# Patient Record
Sex: FEMALE | Race: WHITE | HISPANIC OR LATINO | Employment: UNEMPLOYED | URBAN - METROPOLITAN AREA
[De-identification: names, ages, dates, MRNs, and addresses within clinical notes are randomized per-mention and may not be internally consistent; named-entity substitution may affect disease eponyms.]

---

## 2018-02-07 ENCOUNTER — NON-PROVIDER VISIT (OUTPATIENT)
Dept: OBGYN | Facility: CLINIC | Age: 38
End: 2018-02-07

## 2018-02-07 DIAGNOSIS — Z32.01 PREGNANCY EXAMINATION OR TEST, POSITIVE RESULT: ICD-10-CM

## 2018-02-07 LAB
INT CON NEG: NEGATIVE
INT CON POS: POSITIVE
POC URINE PREGNANCY TEST: POSITIVE

## 2018-02-07 PROCEDURE — 81025 URINE PREGNANCY TEST: CPT | Performed by: OBSTETRICS & GYNECOLOGY

## 2018-02-15 ENCOUNTER — APPOINTMENT (OUTPATIENT)
Dept: OBGYN | Facility: CLINIC | Age: 38
End: 2018-02-15

## 2018-03-20 ENCOUNTER — INITIAL PRENATAL (OUTPATIENT)
Dept: OBGYN | Facility: CLINIC | Age: 38
End: 2018-03-20

## 2018-03-20 ENCOUNTER — HOSPITAL ENCOUNTER (OUTPATIENT)
Facility: MEDICAL CENTER | Age: 38
End: 2018-03-20
Attending: NURSE PRACTITIONER
Payer: COMMERCIAL

## 2018-03-20 VITALS
HEIGHT: 61 IN | WEIGHT: 136 LBS | DIASTOLIC BLOOD PRESSURE: 62 MMHG | BODY MASS INDEX: 25.68 KG/M2 | SYSTOLIC BLOOD PRESSURE: 114 MMHG

## 2018-03-20 DIAGNOSIS — O09.522 ELDERLY MULTIGRAVIDA IN SECOND TRIMESTER: ICD-10-CM

## 2018-03-20 DIAGNOSIS — Z34.82 ENCOUNTER FOR SUPERVISION OF OTHER NORMAL PREGNANCY IN SECOND TRIMESTER: ICD-10-CM

## 2018-03-20 DIAGNOSIS — Z34.82 ENCOUNTER FOR SUPERVISION OF OTHER NORMAL PREGNANCY IN SECOND TRIMESTER: Primary | ICD-10-CM

## 2018-03-20 LAB
APPEARANCE UR: NORMAL
BILIRUB UR STRIP-MCNC: NORMAL MG/DL
COLOR UR AUTO: NORMAL
GLUCOSE UR STRIP.AUTO-MCNC: NEGATIVE MG/DL
KETONES UR STRIP.AUTO-MCNC: NEGATIVE MG/DL
LEUKOCYTE ESTERASE UR QL STRIP.AUTO: NORMAL
NITRITE UR QL STRIP.AUTO: NEGATIVE
PH UR STRIP.AUTO: 6.5 [PH] (ref 5–8)
PROT UR QL STRIP: NEGATIVE MG/DL
RBC UR QL AUTO: NORMAL
SP GR UR STRIP.AUTO: 1.01
UROBILINOGEN UR STRIP-MCNC: NORMAL MG/DL

## 2018-03-20 PROCEDURE — 90471 IMMUNIZATION ADMIN: CPT | Performed by: NURSE PRACTITIONER

## 2018-03-20 PROCEDURE — 81002 URINALYSIS NONAUTO W/O SCOPE: CPT | Performed by: NURSE PRACTITIONER

## 2018-03-20 PROCEDURE — 90686 IIV4 VACC NO PRSV 0.5 ML IM: CPT | Performed by: NURSE PRACTITIONER

## 2018-03-20 PROCEDURE — 59401 PR NEW OB VISIT: CPT | Performed by: NURSE PRACTITIONER

## 2018-03-20 ASSESSMENT — ENCOUNTER SYMPTOMS
RESPIRATORY NEGATIVE: 1
PSYCHIATRIC NEGATIVE: 1
EYES NEGATIVE: 1
MUSCULOSKELETAL NEGATIVE: 1
NEUROLOGICAL NEGATIVE: 1
GASTROINTESTINAL NEGATIVE: 1
CONSTITUTIONAL NEGATIVE: 1
CARDIOVASCULAR NEGATIVE: 1

## 2018-03-20 NOTE — LETTER
Estudios Para Detectar los Portadores de la Fibrosis Quística   (La Enfermedad Fibroquística del Páncreas)    Gloria Guillermo Murali    Esta información esta relacionada con un examen de vivien que puede determinar si usted o hoffman yelitza es portador del gen que causa la enfermedad hereditaria que se llama la fibrosis quística.     ¿QUE ES LA ENFERMEDAD FIBROSIS QUÍSTICA?  · La  fibrosis quística es ellen enfermedad hereditaria que afecta a más de 25,000 niños y jóvenes adultos americanos.  · Los síntomas de la fibrosis quística varían de ellen persona a otra.  Los síntomas más comunes son congestión pulmonar, diarrea y retraso en el crecimiento del sarah beth.  La mayoría de las personas con la fibrosis quística padecen de problemas médicos muy severos, y algunas mueren jóvenes.  Otras tienen tan pocos síntomas que no se percatan de que tienen fibrosis quística.  · La fibrosis quística no afecta en absoluto la inteligencia de la persona.  · Aunque actualmente no hay ellen hallie para la fibrosis quística, los científicos están avanzando con respecto a nuevos tratamientos y en la búsqueda de la hallie.  Anteriormente la mayoría de las personas que padecían de fibrosis quística morían muy jóvenes.  Hoy en día, muchas personas que padecen de la fibrosis quística sobreviven hasta los 20 o 30 anos de edad.    ¿EXISTE LA POSIBILIDAD DE QUE MI MALLIKA PUEDA TENER FIBROSIS QUÍSTICA?  · Usted puede tener un hijo con la fibrosis quística aun cuando no hay nadie en hoffman moi que la padezca.  (Wally la grafica que sigue.)  · Existe ellen prueba que puede ayudarle a determinar si jovon un gen para la fibrosis quística y si por eso corre un riesgo de tener un hijo con la enfermedad.  · Si ambos padres son portadores del gen para la fibrosis quística, hay ellen (1) probabilidad entre 4 (25%) en cada embarazo, de que puedan tener un hijo afectada con fibrosis quística.  · Los portadores del gen para la fibrosis quística tienen ellen copia del gen  normal y el otro gen alterado.  · Las personas con fibrosis quística tienen dos genes alterados para la fibrosis quística,  · Normalmente la mayoría de individuos tienen dos copias normales del gen para fibrosis quística.    Riesgo aproximado de que ellen yelitza sin familiares con fibrosis quística pueda tener un hijo con fibrosis quística:  Origen / Riesgo  Ellen yelitza Caucásica (de laurie maría):  1 en 2,500  Ellen yelitza :  1 en 8,000  Ellen yelitza Afroamericana (de laurie neetu):  1 en 15,000  Ellen yelitza Asiática:  1 en 32,000    ¿EXISTEN PRUEBAS PARA DETECTAR LOS PORTADORES DE LA FIBROSIS QUÍSTICA?  · Hay ellen prueba de vivien para determinar si usted o hoffman yelitza portan el gen para la fibrosis quística.  · Es importante entender que la prueba no detecta todos los portadores del gen para la fibrosis quística.  · Si la prueba determina que ambos padres con portadores, se puede realizar otras pruebas para determinar si hoffman futuro christopher esta afectado.    ¿CUANTO YIAR LA PRUEBA PARA DETERMINAR SI SOY PORTADOR(A) DEL GEN PARA LA FIBROSIS QUÍSTICA?  · El costo de la prueba varia según hoffman póliza de seguro medico y el laboratorio donde se efectúa el análisis.  · El costo promedio de la prueba es de $300.  · Hoffman consejera genética puede darle mas información acera de esta prueba para determinar si usted es portador de la fibrosis quística.    _____  Si, yo estoy interesada y me gustaria obtener mas información al respecto.  _____  No tengo interés ni en hacerme la prueba para determinar si soy portador(a) de gen para la fibrosis quística ni en recibir mas información al respecto.    Firma del paciente: _____________________________   3/20/2018

## 2018-03-20 NOTE — PROGRESS NOTES
"Subjective:      S:  Gloria Carrion is a 37 y.o.  female  @ EGA: 17w2d JASMIN: Estimated Date of Delivery: 18  per LMP who presents for her new OB exam.  She has no complaints.  Desires AFP.  Declines CF.  Reports positive FM, no VB, LOF, or cramping.  Denies dysuria, vaginal DC.  Pt is  and lives with FOB. Works as homemaker.  Pregnancy is unplanned but desired.  Declines Centering Pregnancy.    O:    Vitals:    18 1405   BP: 114/62   Weight: 61.7 kg (136 lb)   Height: 1.549 m (5' 1\")    See H&P Prenatal Physical.  Wet mount: not indicated        FHTs: 150        Fundal ht: 17cm     A:   1.  IUP @ 17w2d JASMIN: Estimated Date of Delivery: 18 per LMP         2.  S=D        3.    Patient Active Problem List    Diagnosis Date Noted   • Elderly multigravida in second trimester 2018         P:  1.  GC/CT done. Pap done.         2.  Prenatal labs ordered - lab slip given        3.  Discussed PNV, diet, and adequate water intake        4.  NOB packet given        5.  Return to office in 4 wks        6.  Referral to Dr. Salmon for genetic counseling        7.  Flu vaccine today          HPI    Review of Systems   Constitutional: Negative.    HENT: Negative.    Eyes: Negative.    Respiratory: Negative.    Cardiovascular: Negative.    Gastrointestinal: Negative.    Genitourinary: Negative.         Uterus enlarged   Musculoskeletal: Negative.    Skin: Negative.    Neurological: Negative.    Endo/Heme/Allergies: Negative.    Psychiatric/Behavioral: Negative.           Objective:     /62   Ht 1.549 m (5' 1\")   Wt 61.7 kg (136 lb)   LMP 2017 (LMP Unknown)   BMI 25.70 kg/m²      Physical Exam   Constitutional: She is oriented to person, place, and time. She appears well-developed and well-nourished.   HENT:   Head: Normocephalic and atraumatic.   Eyes: Pupils are equal, round, and reactive to light.   Neck: Normal range of motion. Neck supple.   Cardiovascular: Normal " rate, regular rhythm and normal heart sounds.    Pulmonary/Chest: Effort normal and breath sounds normal.   Abdominal: Soft.   Genitourinary: Vagina normal and uterus normal.   Musculoskeletal: Normal range of motion.   Neurological: She is alert and oriented to person, place, and time. She has normal reflexes.   Skin: Skin is warm and dry.   Psychiatric: She has a normal mood and affect. Her behavior is normal. Judgment and thought content normal.   Nursing note and vitals reviewed.              Assessment/Plan:     1. Encounter for supervision of other normal pregnancy in second trimester    - THINPREP PAP W/HPV AND CTNG; Future  - PREG CNTR PRENATAL PN; Future  - AFP TETRA; Future  - Flu Quad Inj >3 Year Pre-Filled (Preservative Free)  - REFERRAL TO PERINATOLOGY    2. Elderly multigravida in second trimester

## 2018-03-21 NOTE — PROGRESS NOTES
Referral faxed to Dr. Salmon on 3/21/18.  They will contact patient to schedule appt.  Please check with patient if appt was made/ document. Thank you

## 2018-03-22 LAB
C TRACH DNA GENITAL QL NAA+PROBE: NEGATIVE
CYTOLOGY REG CYTOL: NORMAL
HPV HR 12 DNA CVX QL NAA+PROBE: NEGATIVE
HPV16 DNA SPEC QL NAA+PROBE: NEGATIVE
HPV18 DNA SPEC QL NAA+PROBE: NEGATIVE
N GONORRHOEA DNA GENITAL QL NAA+PROBE: NEGATIVE
SPECIMEN SOURCE: NORMAL
SPECIMEN SOURCE: NORMAL

## 2018-04-17 ENCOUNTER — ROUTINE PRENATAL (OUTPATIENT)
Dept: OBGYN | Facility: CLINIC | Age: 38
End: 2018-04-17

## 2018-04-17 VITALS — SYSTOLIC BLOOD PRESSURE: 122 MMHG | DIASTOLIC BLOOD PRESSURE: 64 MMHG | BODY MASS INDEX: 25.7 KG/M2 | WEIGHT: 136 LBS

## 2018-04-17 DIAGNOSIS — O09.522 ELDERLY MULTIGRAVIDA IN SECOND TRIMESTER: ICD-10-CM

## 2018-04-17 PROCEDURE — 90040 PR PRENATAL FOLLOW UP: CPT | Performed by: NURSE PRACTITIONER

## 2018-04-17 NOTE — PROGRESS NOTES
S) Pt is a 37 y.o.   at 21w2d  gestation. Routine prenatal care today. No complaints today. Hasn't yet done labs. Has US and appt with Dr Salmon on 18.  labor precautions discussed, all questions answered.    Fetal movement Normal  Cramping no  VB no  LOF no   Denies dysuria. Generally feels well today. Good self-care activities identified. Denies headaches, swelling, visual changes, or epigastric pain .     O) Blood pressure 122/64, weight 61.7 kg (136 lb), last menstrual period 2017.        Labs:       PNL: Not done yet       GCT: too early       AFP: Not done yet. Seeing Luis Antonio for genetic screening       GBS: N/A       Pertinent ultrasound -        Scheduled with Dr Salmon 18    A) IUP at 21w2d       S=D         Patient Active Problem List    Diagnosis Date Noted   • Elderly multigravida in second trimester 2018          SVE: deferred         TDAP: no       FLU: yes        BTL: no       : n/a       C/S Consent: n/a       IOL or C/S scheduled: no       LAST PAP: 3/20/18- Negative         P) s/s ptl vs general discomforts. Fetal movements reviewed. General ed and anticipatory guidance. Nutrition/exercise/vitamin. Plans breast Plans pp contraception- unsure  Continue PNV.

## 2018-04-17 NOTE — PROGRESS NOTES
Pt here today for OB follow up  Pt states no complaints   Reports +FM   Good # [phone]  Pharmacy Confirmed.  Pt has appt with HOANG on 4/19/18.  Pt will get labs done ASAP.

## 2018-04-20 ENCOUNTER — HOSPITAL ENCOUNTER (OUTPATIENT)
Dept: LAB | Facility: MEDICAL CENTER | Age: 38
End: 2018-04-20
Attending: NURSE PRACTITIONER
Payer: COMMERCIAL

## 2018-04-20 DIAGNOSIS — Z34.82 ENCOUNTER FOR SUPERVISION OF OTHER NORMAL PREGNANCY IN SECOND TRIMESTER: ICD-10-CM

## 2018-04-20 LAB
ABO GROUP BLD: NORMAL
APPEARANCE UR: CLEAR
BACTERIA #/AREA URNS HPF: ABNORMAL /HPF
BASOPHILS # BLD AUTO: 0.5 % (ref 0–1.8)
BASOPHILS # BLD: 0.04 K/UL (ref 0–0.12)
BILIRUB UR QL STRIP.AUTO: NEGATIVE
BLD GP AB SCN SERPL QL: NORMAL
COLOR UR: YELLOW
EOSINOPHIL # BLD AUTO: 0.05 K/UL (ref 0–0.51)
EOSINOPHIL NFR BLD: 0.6 % (ref 0–6.9)
EPI CELLS #/AREA URNS HPF: ABNORMAL /HPF
ERYTHROCYTE [DISTWIDTH] IN BLOOD BY AUTOMATED COUNT: 43.5 FL (ref 35.9–50)
GLUCOSE UR STRIP.AUTO-MCNC: NEGATIVE MG/DL
HBV SURFACE AG SER QL: NEGATIVE
HCT VFR BLD AUTO: 33.5 % (ref 37–47)
HGB BLD-MCNC: 11.5 G/DL (ref 12–16)
HIV 1+2 AB+HIV1 P24 AG SERPL QL IA: NON REACTIVE
HYALINE CASTS #/AREA URNS LPF: ABNORMAL /LPF
IMM GRANULOCYTES # BLD AUTO: 0.08 K/UL (ref 0–0.11)
IMM GRANULOCYTES NFR BLD AUTO: 1 % (ref 0–0.9)
KETONES UR STRIP.AUTO-MCNC: NEGATIVE MG/DL
LEUKOCYTE ESTERASE UR QL STRIP.AUTO: NEGATIVE
LYMPHOCYTES # BLD AUTO: 1.78 K/UL (ref 1–4.8)
LYMPHOCYTES NFR BLD: 22.4 % (ref 22–41)
MCH RBC QN AUTO: 32.8 PG (ref 27–33)
MCHC RBC AUTO-ENTMCNC: 34.3 G/DL (ref 33.6–35)
MCV RBC AUTO: 95.4 FL (ref 81.4–97.8)
MICRO URNS: ABNORMAL
MONOCYTES # BLD AUTO: 0.58 K/UL (ref 0–0.85)
MONOCYTES NFR BLD AUTO: 7.3 % (ref 0–13.4)
NEUTROPHILS # BLD AUTO: 5.43 K/UL (ref 2–7.15)
NEUTROPHILS NFR BLD: 68.2 % (ref 44–72)
NITRITE UR QL STRIP.AUTO: NEGATIVE
NRBC # BLD AUTO: 0 K/UL
NRBC BLD-RTO: 0 /100 WBC
PH UR STRIP.AUTO: 6 [PH]
PLATELET # BLD AUTO: 238 K/UL (ref 164–446)
PMV BLD AUTO: 10.6 FL (ref 9–12.9)
PROT UR QL STRIP: NEGATIVE MG/DL
RBC # BLD AUTO: 3.51 M/UL (ref 4.2–5.4)
RBC # URNS HPF: ABNORMAL /HPF
RBC UR QL AUTO: ABNORMAL
RH BLD: NORMAL
RUBV AB SER QL: 223.6 IU/ML
SP GR UR STRIP.AUTO: 1.02
TREPONEMA PALLIDUM IGG+IGM AB [PRESENCE] IN SERUM OR PLASMA BY IMMUNOASSAY: NON REACTIVE
UROBILINOGEN UR STRIP.AUTO-MCNC: 0.2 MG/DL
WBC # BLD AUTO: 8 K/UL (ref 4.8–10.8)
WBC #/AREA URNS HPF: ABNORMAL /HPF

## 2018-04-23 ENCOUNTER — DATING (OUTPATIENT)
Dept: OBGYN | Facility: CLINIC | Age: 38
End: 2018-04-23

## 2018-04-23 DIAGNOSIS — O09.522 ELDERLY MULTIGRAVIDA IN SECOND TRIMESTER: ICD-10-CM

## 2018-04-24 LAB
# FETUSES US: NORMAL
AFP MOM SERPL: 0.76
AFP SERPL-MCNC: 60 NG/ML
AGE - REPORTED: 37.1 YR
CURRENT SMOKER: NO
FAMILY MEMBER DISEASES HX: NO
GA METHOD: NORMAL
GA: NORMAL WK
HCG MOM SERPL: 0.82
HCG SERPL-ACNC: NORMAL IU/L
HX OF HEREDITARY DISORDERS: NO
IDDM PATIENT QL: NO
INHIBIN A MOM SERPL: 0.78
INHIBIN A SERPL-MCNC: 173 PG/ML
INTEGRATED SCN PATIENT-IMP: NORMAL
PATHOLOGY STUDY: NORMAL
SPECIMEN DRAWN SERPL: NORMAL
U ESTRIOL MOM SERPL: 0.78
U ESTRIOL SERPL-MCNC: 2.22 NG/ML

## 2018-05-15 ENCOUNTER — ROUTINE PRENATAL (OUTPATIENT)
Dept: OBGYN | Facility: CLINIC | Age: 38
End: 2018-05-15

## 2018-05-15 VITALS — SYSTOLIC BLOOD PRESSURE: 130 MMHG | BODY MASS INDEX: 26.83 KG/M2 | DIASTOLIC BLOOD PRESSURE: 78 MMHG | WEIGHT: 142 LBS

## 2018-05-15 DIAGNOSIS — O09.522 ELDERLY MULTIGRAVIDA IN SECOND TRIMESTER: ICD-10-CM

## 2018-05-15 PROCEDURE — 90040 PR PRENATAL FOLLOW UP: CPT | Performed by: PHYSICIAN ASSISTANT

## 2018-05-15 NOTE — PROGRESS NOTES
Ob f/u. + fetal movement good  No VB, LOF or contractions   C/O pelvic pain   Phone number # [phone]  Pharmacy verified with patient  WT= 142 lbs             OL=162/78  3rd trimester lab orders pended and instructions given to patient

## 2018-05-15 NOTE — PROGRESS NOTES
Pt has no complaints with cramping, bleeding or pain. +FM. US results d/w pt - all wnl. 1hr GTT, H/H, RPR slip given today with instructions. RTC 4 wk or sooner prn.

## 2018-05-29 ENCOUNTER — HOSPITAL ENCOUNTER (OUTPATIENT)
Dept: LAB | Facility: MEDICAL CENTER | Age: 38
End: 2018-05-29
Attending: PHYSICIAN ASSISTANT
Payer: COMMERCIAL

## 2018-05-29 DIAGNOSIS — R73.09 ABNORMAL GTT (GLUCOSE TOLERANCE TEST): Primary | ICD-10-CM

## 2018-05-29 DIAGNOSIS — O09.522 ELDERLY MULTIGRAVIDA IN SECOND TRIMESTER: ICD-10-CM

## 2018-05-29 LAB
GLUCOSE 1H P 50 G GLC PO SERPL-MCNC: 162 MG/DL (ref 70–139)
HCT VFR BLD AUTO: 33.5 % (ref 37–47)
HGB BLD-MCNC: 11.1 G/DL (ref 12–16)
TREPONEMA PALLIDUM IGG+IGM AB [PRESENCE] IN SERUM OR PLASMA BY IMMUNOASSAY: NON REACTIVE

## 2018-05-30 ENCOUNTER — TELEPHONE (OUTPATIENT)
Dept: OBGYN | Facility: CLINIC | Age: 38
End: 2018-05-30

## 2018-05-31 ENCOUNTER — HOSPITAL ENCOUNTER (OUTPATIENT)
Dept: LAB | Facility: MEDICAL CENTER | Age: 38
End: 2018-05-31
Attending: NURSE PRACTITIONER
Payer: COMMERCIAL

## 2018-05-31 DIAGNOSIS — R73.09 ABNORMAL GTT (GLUCOSE TOLERANCE TEST): ICD-10-CM

## 2018-05-31 LAB
GLUCOSE 1H P CHAL SERPL-MCNC: 137 MG/DL (ref 65–180)
GLUCOSE 2H P CHAL SERPL-MCNC: 109 MG/DL (ref 65–155)
GLUCOSE 3H P CHAL SERPL-MCNC: 101 MG/DL (ref 65–140)
GLUCOSE BS SERPL-MCNC: 77 MG/DL (ref 65–95)

## 2018-06-12 ENCOUNTER — ROUTINE PRENATAL (OUTPATIENT)
Dept: OBGYN | Facility: CLINIC | Age: 38
End: 2018-06-12

## 2018-06-12 VITALS — SYSTOLIC BLOOD PRESSURE: 126 MMHG | DIASTOLIC BLOOD PRESSURE: 80 MMHG | WEIGHT: 145 LBS | BODY MASS INDEX: 27.4 KG/M2

## 2018-06-12 DIAGNOSIS — O09.893 SUPERVISION OF OTHER HIGH RISK PREGNANCIES, THIRD TRIMESTER: Primary | ICD-10-CM

## 2018-06-12 DIAGNOSIS — O09.522 ELDERLY MULTIGRAVIDA IN SECOND TRIMESTER: ICD-10-CM

## 2018-06-12 PROCEDURE — 90471 IMMUNIZATION ADMIN: CPT | Performed by: NURSE PRACTITIONER

## 2018-06-12 PROCEDURE — 90040 PR PRENATAL FOLLOW UP: CPT | Performed by: NURSE PRACTITIONER

## 2018-06-12 PROCEDURE — 90715 TDAP VACCINE 7 YRS/> IM: CPT | Performed by: NURSE PRACTITIONER

## 2018-06-12 NOTE — PROGRESS NOTES
S:  Pt is  at 29w2d for routine OB follow up.  Reports some pelvic pain.  Reports good FM.  Denies VB, LOF, RUCs or vaginal DC.    O:  Please see above vitals.        FHTs: 139        Fundal ht: 29 cm.    A:  IUP at 29w2d  Patient Active Problem List    Diagnosis Date Noted   • Supervision of other high risk pregnancies, third trimester 2018   • Elderly multigravida in second trimester 2018        P:  1.  PP contraception: plans condoms.          2.  Instructions given on FKCs.          3.  Questions answered.          4.  Encouraged pt to tour L&D.          5.  Encourage adequate water intake.        6.  3hr GTT wnl - reviewed w pt.        7.   labor precautions reviewed.         8.  F/u 2 wks.        9.  TDap given.    I have placed the below orders and discussed them with an approved delegating provider. The MA is performing the below orders under the direction of  Dr. Fontenot.

## 2018-06-12 NOTE — PROGRESS NOTES
Tdap vaccine given. 06/12/2018 Right  Deltoid. VIS given and screening check list reviewed with pt. Verified by. Disha Velasco. C.N.M

## 2018-06-12 NOTE — LETTER
"Count Your Baby's Movements  Another step to a healthy delivery    Murali Li            Dept: [phone]    How Many Weeks Pregnant? 29w2d    Date to Begin Countin2018              How to use this chart    One way for your physician to keep track of your baby's health is by knowing how often the baby moves (or \"kicks\") in your womb.  You can help your physician to do this by using this chart every day.    Every day, you should see how many hours it takes for your baby to move 10 times.  Start in the morning, as soon as you get up.    · First, write down the time your baby moves until you get to 10.  · Check off one box every time your baby moves until you get to 10.  · Write down the time you finished counting in the last column.  · Total how long it took to count up all 10 movements.  · Finally, fill in the box that shows how long this took.  After counting 10 movements, you no longer have to count any more that day.  The next morning, just start counting again as soon as you get up.    What should you call a \"movement\"?  It is hard to say, because it will feel different from one mother to another and from one pregnancy to the next.  The important thing is that you count the movements the same way throughout your pregnancy.  If you have more questions, you should ask your physician.    Count carefully every day!  SAMPLE:  Week 28    How many hours did it take to feel 10 movements?       Start  Time     1     2     3     4     5     6     7     8     9     10   Finish Time   Mon 8:20 ·  ·  ·  ·  ·  ·  ·  ·  ·  ·  11:40   Tue               Wed               Thu               Fri               Sat               Sun                 IMPORTANT: You should contact your physician if it takes more than two hours for you to feel 10 movements.  Each morning, write down the time and start to count the movements of your baby.  Keep track by checking off one box every time you feel one movement.  When " "you have felt 10 \"kicks\", write down the time you finished counting in the last column.  Then fill in the   box (over the check ermias) for the number of hours it took.  Be sure to read the complete instructions on the previous page.            "

## 2018-06-12 NOTE — PATIENT INSTRUCTIONS
P:  1.  PP contraception: plans condoms.          2.  Instructions given on FKCs.          3.  Questions answered.          4.  Encouraged pt to tour L&D.          5.  Encourage adequate water intake.        6.  3hr GTT wnl - reviewed w pt.        7.   labor precautions reviewed.         8.  F/u 2 wks.        9.  TDap given.

## 2018-06-12 NOTE — PROGRESS NOTES
Ob f/u. + fetal movement   No VB, LOF or contractions   C/O pelvic pain   Phone number # [phone]  Pharmacy verified with patient  [id number]            GG=[id number]/80  WILLIE given today with instructions   BTL offered today. Pt declines

## 2018-06-28 ENCOUNTER — ROUTINE PRENATAL (OUTPATIENT)
Dept: OBGYN | Facility: CLINIC | Age: 38
End: 2018-06-28

## 2018-06-28 VITALS — SYSTOLIC BLOOD PRESSURE: 120 MMHG | BODY MASS INDEX: 28.15 KG/M2 | WEIGHT: 149 LBS | DIASTOLIC BLOOD PRESSURE: 68 MMHG

## 2018-06-28 DIAGNOSIS — O09.523 ELDERLY MULTIGRAVIDA IN THIRD TRIMESTER: ICD-10-CM

## 2018-06-28 DIAGNOSIS — O09.893 SUPERVISION OF OTHER HIGH RISK PREGNANCIES, THIRD TRIMESTER: ICD-10-CM

## 2018-06-28 PROCEDURE — 90040 PR PRENATAL FOLLOW UP: CPT | Performed by: NURSE PRACTITIONER

## 2018-06-28 NOTE — PROGRESS NOTES
SUBJECTIVE:  Pt is a 37 y.o.   at 31w4d  gestation. Presents today for follow-up prenatal care. Reports no issues at this time.  Reports good fetal movement. Denies cramping/contractions, bleeding or leaking of fluid. Denies dysuria, headaches, N/V, or other issues at this time. Generally feels well today.     OBJECTIVE:  - See prenatal vitals flow  -   Vitals:    18 1629   BP: 120/68   Weight: 67.6 kg (149 lb)                 ASSESSMENT:   - IUP at 31w4d    - S=D   -   Patient Active Problem List    Diagnosis Date Noted   • Supervision of other high risk pregnancies, third trimester 2018   • Elderly multigravida in third trimester 2018         PLAN:  - S/sx pregnancy and labor warning signs vs general discomforts discussed  - Fetal movements and kick counts reviewed   - Adequate hydration reinforced  - Nutrition/exercise/vitamin education; continued PNV  - Encouraged tour of LnD/childbirth education classes: contact info provided   - S/p TDAP vacc  - Anticipatory guidance given  - RTC in 2 weeks for follow-up prenatal care

## 2018-06-28 NOTE — PROGRESS NOTES
Pt here today for OB follow up  Pt states having lower abdominal pain   Reports +  Good # [phone]  Pharmacy Confirmed.

## 2018-06-28 NOTE — LETTER
Cuente los Movimientos de hoffman Bebé  Otro paso importante para la chava de hoffman bebé    Gloria Carrion     [facility]            Dept: [phone]    ¿Cuántas semanas tiene de embarazo? 31w4d    Fecha cuando tiene que comenzar a contar el movimiento: Ahora                  Madai debe usar geoff diagrama    Ellen manera en que hoffman doctor puede controlar a chava de hoffman bebé es sabiendo cuantas veces se mueve hoffman bebé en el útero, o por medio de las “pataditas”.  Usted podrá ayudarle a hoffman médico al usar cada día el siguiente diagrama.    Cada día, usted debe prestar atención a cuantas horas le lleva a hoffman bebé moverse 10 veces.  Comience a contar en la mañana, lo antes posible después de haberse levantado.    · Primeramente, escriba la hora en que se mueve hoffman bebé, hasta llegar a 10 veces.  · Colóquele un check o palomita a cada cuadrito cada vez que hoffman bebé se mueva hasta que complete 10 veces.  · Escriba la hora cuando termine de contar 10 veces en la última columna.  · Sume el total del tiempo que le llevó contar los 10 movimientos.  · Finalmente, complete el cuadrito de cuantas horas le llevó hacerlo.    Después de maryse contado los 10 movimientos, ya no tendrá que contar los demás movimientos por el amanda del día.  A la mañana siguiente, comience a contar de nuevo cuantas veces se mueve el bebé desde el momento en que se levante.    ¿Qué tendría que considerarse un “movimiento”?  Es difícil de decirlo porque es distinto de ellen madre a otra, y de un embarazo a otro.  Lo importante es que cuente el movimiento de la misma manera dustin el transcurso de hoffman embarazo.  Si tiene preguntas adicionales, pregúntele a hoffman doctor.    ¡Cuente cuidadosamente cada día!     MUESTRA:  Semana 28    ¿Cuántas horas le ha llevado sentir 10 movimientos?        Hora de Inicio     1     2     3     4     5     6     7     8     9     10   Hora de Finlizar   Corrine. 8:20 ·  ·  ·  ·  ·  ·  ·  ·  ·  ·  11:40   Mar.               Mié.                Jue.               Vie.               Sáb.               Dom.                 IMPORTANTE:  Usted debe contactar a hoffman doctor si le lleva más de 2 horas sentir 10 movimientos de hoffman bebé.    Cada mañana, escriba la hora de inicio y comience a contar los movimientos de hoffman bebé.  Hágalo colocándole un check o palomita a cada cuadrito cada vez que sienta un movimiento de hoffman bebé.  Cuando haya sentido 10 “pataditas”, escriba la hora en que terminó de contar en la última columna.  Luego, complete en la cajita (arriba de la elliott de check o palomita) el número total de horas que le llevó hacerlo.  Asegúrese de leer completamente las instrucciones en la página anterior.

## 2018-07-12 ENCOUNTER — ROUTINE PRENATAL (OUTPATIENT)
Dept: OBGYN | Facility: CLINIC | Age: 38
End: 2018-07-12

## 2018-07-12 VITALS — SYSTOLIC BLOOD PRESSURE: 116 MMHG | BODY MASS INDEX: 27.78 KG/M2 | WEIGHT: 147 LBS | DIASTOLIC BLOOD PRESSURE: 60 MMHG

## 2018-07-12 DIAGNOSIS — O09.523 ELDERLY MULTIGRAVIDA IN THIRD TRIMESTER: ICD-10-CM

## 2018-07-12 PROCEDURE — 90040 PR PRENATAL FOLLOW UP: CPT | Performed by: NURSE PRACTITIONER

## 2018-07-12 NOTE — PROGRESS NOTES
SUBJECTIVE:  Pt is a 37 y.o.   at 33w4d  gestation. Presents today for follow-up prenatal care. Reports no issues at this time.  Reports good fetal movement. Denies cramping/contractions, bleeding or leaking of fluid. Denies dysuria, headaches, N/V, or other issues at this time. Generally feels well today. Report lower menstrual like cramping with movement. Not drinking much water.     OBJECTIVE:  - See prenatal vitals flow  -   Vitals:    18 1606   BP: 116/60   Weight: 66.7 kg (147 lb)                 ASSESSMENT:   - IUP at 33w4d    - S=D   -   Patient Active Problem List    Diagnosis Date Noted   • Elderly multigravida in third trimester 2018         PLAN:  - S/sx pregnancy and labor warning signs vs general discomforts discussed  - Fetal movements and kick counts reviewed   - Adequate hydration reinforced  - Nutrition/exercise/vitamin education; continued PNV   - S/p TDAP vacc  - Preg support belt, more water, tylenol, warm/cold packs   - Anticipatory guidance given  - RTC in 2 weeks for follow-up prenatal care

## 2018-07-12 NOTE — PROGRESS NOTES
Pt here today for OB follow up  Pt states some cramping   Reports +MICHELL Colon # [phone]  Pharmacy Confirmed.

## 2018-07-26 ENCOUNTER — ROUTINE PRENATAL (OUTPATIENT)
Dept: OBGYN | Facility: CLINIC | Age: 38
End: 2018-07-26

## 2018-07-26 ENCOUNTER — HOSPITAL ENCOUNTER (OUTPATIENT)
Facility: MEDICAL CENTER | Age: 38
End: 2018-07-26
Attending: NURSE PRACTITIONER
Payer: COMMERCIAL

## 2018-07-26 VITALS — SYSTOLIC BLOOD PRESSURE: 120 MMHG | DIASTOLIC BLOOD PRESSURE: 64 MMHG | BODY MASS INDEX: 28.15 KG/M2 | WEIGHT: 149 LBS

## 2018-07-26 DIAGNOSIS — O09.523 ELDERLY MULTIGRAVIDA IN THIRD TRIMESTER: ICD-10-CM

## 2018-07-26 PROCEDURE — 90040 PR PRENATAL FOLLOW UP: CPT | Performed by: NURSE PRACTITIONER

## 2018-07-26 NOTE — PROGRESS NOTES
SUBJECTIVE:  Pt is a 37 y.o.   at 35w4d  gestation. Presents today for follow-up prenatal care. Reports no issues at this time.  Reports good fetal movement. Denies cramping/contractions, bleeding or leaking of fluid. Denies dysuria, headaches, N/V, or other issues at this time. Generally feels well today.     OBJECTIVE:  - See prenatal vitals flow  -   Vitals:    18 1521   BP: 120/64   Weight: 67.6 kg (149 lb)                 ASSESSMENT:   - IUP at 35w4d    - S=D   -   Patient Active Problem List    Diagnosis Date Noted   • Elderly multigravida in third trimester 2018         PLAN:  - S/sx pregnancy and labor warning signs vs general discomforts discussed  - Fetal movements and kick counts reviewed   - Adequate hydration reinforced  - Nutrition/exercise/vitamin education; continued PNV  - GBS collected   - S/p TDAP vacc  - Anticipatory guidance given  - RTC in 1 weeks for follow-up prenatal care

## 2018-07-26 NOTE — PROGRESS NOTES
OB f/u. + fetal movement.  No VB, LOF or UC's.  Good phone # [phone]  GBS collected today  Pt c/o pelvic pressure and pain

## 2018-07-28 LAB — GP B STREP DNA SPEC QL NAA+PROBE: NEGATIVE

## 2018-08-02 ENCOUNTER — ROUTINE PRENATAL (OUTPATIENT)
Dept: OBGYN | Facility: CLINIC | Age: 38
End: 2018-08-02

## 2018-08-02 VITALS — SYSTOLIC BLOOD PRESSURE: 110 MMHG | DIASTOLIC BLOOD PRESSURE: 68 MMHG | WEIGHT: 149 LBS | BODY MASS INDEX: 28.15 KG/M2

## 2018-08-02 DIAGNOSIS — O09.523 ELDERLY MULTIGRAVIDA IN THIRD TRIMESTER: ICD-10-CM

## 2018-08-02 DIAGNOSIS — O09.893 SUPERVISION OF OTHER HIGH RISK PREGNANCIES, THIRD TRIMESTER: Primary | ICD-10-CM

## 2018-08-02 PROCEDURE — 90040 PR PRENATAL FOLLOW UP: CPT | Performed by: NURSE PRACTITIONER

## 2018-08-02 NOTE — PATIENT INSTRUCTIONS
P:  1.  Continue FKCs.         2.  Labor precautions given.  Instructions given on where to go.  Pt receptive to education.         3.  Reviewed GBS status w pt.       4.  Questions answered.         5.  Encouraged adequate water intake       6.  F/u 1wk       7.  PP contraception: condoms.

## 2018-08-02 NOTE — PROGRESS NOTES
OB f/u. + fetal movement.  No VB, LOF   Good phone # [phone]  GBS negative  Pt c/o irregular UC

## 2018-08-02 NOTE — PROGRESS NOTES
S:  Pt is  at 36w4d here for routine OB follow up.  Reports irr UC.  Reports good FM.  Denies VB, LOF, RUCs, or vaginal DC.     O:  Please see above vitals.        FHTs: 138        Fundal ht: 36        Fetal position: vertex        SVE: deferred        GBS neg on 18 -- reviewed w pt.      A:  IUP at 36w4d  Patient Active Problem List    Diagnosis Date Noted   • Elderly multigravida in third trimester 2018       P:  1.  Continue FKCs.         2.  Labor precautions given.  Instructions given on where to go.  Pt receptive to education.         3.  Reviewed GBS status w pt.       4.  Questions answered.         5.  Encouraged adequate water intake       6.  F/u 1wk       7.  PP contraception: condoms.

## 2018-08-09 ENCOUNTER — ROUTINE PRENATAL (OUTPATIENT)
Dept: OBGYN | Facility: CLINIC | Age: 38
End: 2018-08-09

## 2018-08-09 VITALS — BODY MASS INDEX: 28.53 KG/M2 | SYSTOLIC BLOOD PRESSURE: 120 MMHG | DIASTOLIC BLOOD PRESSURE: 66 MMHG | WEIGHT: 151 LBS

## 2018-08-09 DIAGNOSIS — O09.523 ELDERLY MULTIGRAVIDA IN THIRD TRIMESTER: Primary | ICD-10-CM

## 2018-08-09 PROCEDURE — 90040 PR PRENATAL FOLLOW UP: CPT | Performed by: NURSE PRACTITIONER

## 2018-08-09 NOTE — PROGRESS NOTES
S) Pt is a 38 y.o.   at 37w4d  gestation. Routine prenatal care today. Pt c/o some discomfort, especially with moving, to left round ligament area.  Discussed rest, floating in pool if necessary.    Fetal movement Normal  Cramping no  VB no  LOF no   Denies dysuria. Generally feels well today. Good self-care activities identified. Denies headaches, swelling, visual changes, or epigastric pain .     O) Blood pressure 120/66, weight 68.5 kg (151 lb), last menstrual period 2017.        Labs: WNL       PNL: WNL       GCT: elevated 1 hr, normal 3 hr       AFP: normal       GBS: negative       Pertinent ultrasound - 18 Dr Salmon- no further f/u necessary           A) IUP at 37w4d       S=D         Patient Active Problem List    Diagnosis Date Noted   • Elderly multigravida in third trimester 2018          SVE: deferred         TDAP: yes       FLU: yes        BTL: no       : no       C/S Consent: no       IOL or C/S scheduled: no       LAST PAP: 3/20/18 negative         P) Labour precautions discussed.  Discussed  general discomforts. Fetal movements reviewed. General ed and anticipatory guidance. Nutrition/exercise/vitamin. Plans breast Plans pp contraception- unsure  Continue PNV. Discussed GBS results.  RTC 1 week.  Will do SVE next week and plan for IOL at 41 weeks.

## 2018-08-09 NOTE — PROGRESS NOTES
Pt here today for OB follow up  Pt states little back pain and pain lowe left quad  Reports +FM  Good # [phone]  Pharmacy Confirmed.  Chaperone offered and None needed.

## 2018-08-20 ENCOUNTER — ROUTINE PRENATAL (OUTPATIENT)
Dept: OBGYN | Facility: CLINIC | Age: 38
End: 2018-08-20

## 2018-08-20 VITALS — BODY MASS INDEX: 28.91 KG/M2 | DIASTOLIC BLOOD PRESSURE: 72 MMHG | WEIGHT: 153 LBS | SYSTOLIC BLOOD PRESSURE: 118 MMHG

## 2018-08-20 DIAGNOSIS — O09.523 ELDERLY MULTIGRAVIDA IN THIRD TRIMESTER: ICD-10-CM

## 2018-08-20 PROCEDURE — 90040 PR PRENATAL FOLLOW UP: CPT | Performed by: OBSTETRICS & GYNECOLOGY

## 2018-08-20 NOTE — PROGRESS NOTES
DESTIN:  39w1d    Pt reports doing well.  Denies vaginal bleeding, LOF.  Reports +FM.  Some pressure and irregular ctx.    /72   Wt 69.4 kg (153 lb)   LMP 2017 (LMP Unknown)   BMI 28.91 kg/m²   gen: AAO, NAD  FHTs: 145  FH: 39    SVE: 3/70/2  Pelvic exam was chaperoned by MA (TK).      A/P: 38 y.o.  @ 39w1d by lmp c/w 21wk US  - PNL up to date, Rh+, s/p Tdap  - GBS neg    Reviewed labor precautions    RTC 1wks    Berta Burrows MD  [facility], Women's Health

## 2018-08-20 NOTE — PROGRESS NOTES
"Pt here today for OB follow up  Pt states having some pain, pressure and UC\"s  Reports +  Good # [phone]  Pharmacy Confirmed.  Chaperone offered and Present.     "

## 2018-08-21 ENCOUNTER — HOSPITAL ENCOUNTER (INPATIENT)
Facility: MEDICAL CENTER | Age: 38
LOS: 2 days | End: 2018-08-23
Attending: OBSTETRICS & GYNECOLOGY | Admitting: OBSTETRICS & GYNECOLOGY
Payer: MEDICAID

## 2018-08-21 LAB
BASOPHILS # BLD AUTO: 0.3 % (ref 0–1.8)
BASOPHILS # BLD: 0.04 K/UL (ref 0–0.12)
EOSINOPHIL # BLD AUTO: 0.18 K/UL (ref 0–0.51)
EOSINOPHIL NFR BLD: 1.1 % (ref 0–6.9)
ERYTHROCYTE [DISTWIDTH] IN BLOOD BY AUTOMATED COUNT: 45.1 FL (ref 35.9–50)
HCT VFR BLD AUTO: 38.2 % (ref 37–47)
HGB BLD-MCNC: 12.7 G/DL (ref 12–16)
HOLDING TUBE BB 8507: NORMAL
IMM GRANULOCYTES # BLD AUTO: 0.15 K/UL (ref 0–0.11)
IMM GRANULOCYTES NFR BLD AUTO: 1 % (ref 0–0.9)
LYMPHOCYTES # BLD AUTO: 2.52 K/UL (ref 1–4.8)
LYMPHOCYTES NFR BLD: 16.1 % (ref 22–41)
MCH RBC QN AUTO: 29.3 PG (ref 27–33)
MCHC RBC AUTO-ENTMCNC: 33.2 G/DL (ref 33.6–35)
MCV RBC AUTO: 88 FL (ref 81.4–97.8)
MONOCYTES # BLD AUTO: 1.27 K/UL (ref 0–0.85)
MONOCYTES NFR BLD AUTO: 8.1 % (ref 0–13.4)
NEUTROPHILS # BLD AUTO: 11.54 K/UL (ref 2–7.15)
NEUTROPHILS NFR BLD: 73.4 % (ref 44–72)
NRBC # BLD AUTO: 0 K/UL
NRBC BLD-RTO: 0 /100 WBC
PLATELET # BLD AUTO: 285 K/UL (ref 164–446)
PMV BLD AUTO: 10.7 FL (ref 9–12.9)
RBC # BLD AUTO: 4.34 M/UL (ref 4.2–5.4)
WBC # BLD AUTO: 15.7 K/UL (ref 4.8–10.8)

## 2018-08-21 PROCEDURE — 36415 COLL VENOUS BLD VENIPUNCTURE: CPT

## 2018-08-21 PROCEDURE — 85025 COMPLETE CBC W/AUTO DIFF WBC: CPT

## 2018-08-21 PROCEDURE — 304965 HCHG RECOVERY SERVICES

## 2018-08-21 PROCEDURE — 59409 OBSTETRICAL CARE: CPT

## 2018-08-21 PROCEDURE — 700111 HCHG RX REV CODE 636 W/ 250 OVERRIDE (IP)

## 2018-08-21 PROCEDURE — 770002 HCHG ROOM/CARE - OB PRIVATE (112)

## 2018-08-21 RX ORDER — OXYCODONE HYDROCHLORIDE AND ACETAMINOPHEN 5; 325 MG/1; MG/1
1 TABLET ORAL EVERY 4 HOURS PRN
Status: DISCONTINUED | OUTPATIENT
Start: 2018-08-21 | End: 2018-08-23 | Stop reason: HOSPADM

## 2018-08-21 RX ORDER — ACETAMINOPHEN 325 MG/1
325 TABLET ORAL EVERY 4 HOURS PRN
Status: DISCONTINUED | OUTPATIENT
Start: 2018-08-21 | End: 2018-08-23 | Stop reason: HOSPADM

## 2018-08-21 RX ORDER — MISOPROSTOL 200 UG/1
800 TABLET ORAL
Status: DISCONTINUED | OUTPATIENT
Start: 2018-08-21 | End: 2018-08-21 | Stop reason: HOSPADM

## 2018-08-21 RX ORDER — IBUPROFEN 600 MG/1
600 TABLET ORAL EVERY 6 HOURS PRN
Status: DISCONTINUED | OUTPATIENT
Start: 2018-08-21 | End: 2018-08-23 | Stop reason: HOSPADM

## 2018-08-21 RX ORDER — SODIUM CHLORIDE, SODIUM LACTATE, POTASSIUM CHLORIDE, CALCIUM CHLORIDE 600; 310; 30; 20 MG/100ML; MG/100ML; MG/100ML; MG/100ML
INJECTION, SOLUTION INTRAVENOUS
Status: ACTIVE
Start: 2018-08-21 | End: 2018-08-22

## 2018-08-21 RX ORDER — METHYLERGONOVINE MALEATE 0.2 MG/ML
0.2 INJECTION INTRAVENOUS
Status: DISCONTINUED | OUTPATIENT
Start: 2018-08-21 | End: 2018-08-21 | Stop reason: HOSPADM

## 2018-08-21 RX ORDER — OXYTOCIN 10 [USP'U]/ML
INJECTION, SOLUTION INTRAMUSCULAR; INTRAVENOUS
Status: ACTIVE
Start: 2018-08-21 | End: 2018-08-22

## 2018-08-21 RX ORDER — OXYCODONE AND ACETAMINOPHEN 10; 325 MG/1; MG/1
1 TABLET ORAL EVERY 4 HOURS PRN
Status: DISCONTINUED | OUTPATIENT
Start: 2018-08-21 | End: 2018-08-23 | Stop reason: HOSPADM

## 2018-08-21 RX ADMIN — Medication 125 ML/HR: at 21:00

## 2018-08-21 ASSESSMENT — PATIENT HEALTH QUESTIONNAIRE - PHQ9
2. FEELING DOWN, DEPRESSED, IRRITABLE, OR HOPELESS: NOT AT ALL
1. LITTLE INTEREST OR PLEASURE IN DOING THINGS: NOT AT ALL
SUM OF ALL RESPONSES TO PHQ9 QUESTIONS 1 AND 2: 0

## 2018-08-21 ASSESSMENT — LIFESTYLE VARIABLES
ALCOHOL_USE: NO
EVER_SMOKED: NEVER

## 2018-08-21 ASSESSMENT — COPD QUESTIONNAIRES
COPD SCREENING SCORE: 0
DURING THE PAST 4 WEEKS HOW MUCH DID YOU FEEL SHORT OF BREATH: NONE/LITTLE OF THE TIME
IN THE PAST 12 MONTHS DO YOU DO LESS THAN YOU USED TO BECAUSE OF YOUR BREATHING PROBLEMS: DISAGREE/UNSURE
HAVE YOU SMOKED AT LEAST 100 CIGARETTES IN YOUR ENTIRE LIFE: NO/DON'T KNOW
DO YOU EVER COUGH UP ANY MUCUS OR PHLEGM?: NO/ONLY WITH OCCASIONAL COLDS OR INFECTIONS

## 2018-08-22 LAB
ERYTHROCYTE [DISTWIDTH] IN BLOOD BY AUTOMATED COUNT: 44.9 FL (ref 35.9–50)
HCT VFR BLD AUTO: 32.2 % (ref 37–47)
HGB BLD-MCNC: 10.7 G/DL (ref 12–16)
MCH RBC QN AUTO: 29.2 PG (ref 27–33)
MCHC RBC AUTO-ENTMCNC: 33.2 G/DL (ref 33.6–35)
MCV RBC AUTO: 87.7 FL (ref 81.4–97.8)
PLATELET # BLD AUTO: 243 K/UL (ref 164–446)
PMV BLD AUTO: 10.5 FL (ref 9–12.9)
RBC # BLD AUTO: 3.67 M/UL (ref 4.2–5.4)
WBC # BLD AUTO: 18 K/UL (ref 4.8–10.8)

## 2018-08-22 PROCEDURE — 36415 COLL VENOUS BLD VENIPUNCTURE: CPT

## 2018-08-22 PROCEDURE — 85027 COMPLETE CBC AUTOMATED: CPT

## 2018-08-22 PROCEDURE — 770002 HCHG ROOM/CARE - OB PRIVATE (112)

## 2018-08-22 RX ORDER — OXYCODONE HYDROCHLORIDE AND ACETAMINOPHEN 5; 325 MG/1; MG/1
2 TABLET ORAL EVERY 4 HOURS PRN
Status: DISCONTINUED | OUTPATIENT
Start: 2018-08-22 | End: 2018-08-23 | Stop reason: HOSPADM

## 2018-08-22 RX ORDER — IBUPROFEN 600 MG/1
600 TABLET ORAL EVERY 6 HOURS PRN
Status: DISCONTINUED | OUTPATIENT
Start: 2018-08-22 | End: 2018-08-23 | Stop reason: HOSPADM

## 2018-08-22 RX ORDER — ONDANSETRON 2 MG/ML
4 INJECTION INTRAMUSCULAR; INTRAVENOUS EVERY 6 HOURS PRN
Status: DISCONTINUED | OUTPATIENT
Start: 2018-08-22 | End: 2018-08-23 | Stop reason: HOSPADM

## 2018-08-22 RX ORDER — DOCUSATE SODIUM 100 MG/1
100 CAPSULE, LIQUID FILLED ORAL 2 TIMES DAILY
Qty: 60 CAP | Refills: 1 | Status: SHIPPED | OUTPATIENT
Start: 2018-08-22 | End: 2020-10-20

## 2018-08-22 RX ORDER — MISOPROSTOL 200 UG/1
800 TABLET ORAL PRN
Status: DISCONTINUED | OUTPATIENT
Start: 2018-08-22 | End: 2018-08-23 | Stop reason: HOSPADM

## 2018-08-22 RX ORDER — OXYCODONE HYDROCHLORIDE AND ACETAMINOPHEN 5; 325 MG/1; MG/1
1 TABLET ORAL EVERY 4 HOURS PRN
Status: DISCONTINUED | OUTPATIENT
Start: 2018-08-22 | End: 2018-08-23 | Stop reason: HOSPADM

## 2018-08-22 RX ORDER — METHYLERGONOVINE MALEATE 0.2 MG/ML
0.2 INJECTION INTRAVENOUS PRN
Status: DISCONTINUED | OUTPATIENT
Start: 2018-08-22 | End: 2018-08-23 | Stop reason: HOSPADM

## 2018-08-22 RX ORDER — DOCUSATE SODIUM 100 MG/1
100 CAPSULE, LIQUID FILLED ORAL 2 TIMES DAILY PRN
Status: DISCONTINUED | OUTPATIENT
Start: 2018-08-22 | End: 2018-08-23 | Stop reason: HOSPADM

## 2018-08-22 RX ORDER — VITAMIN A ACETATE, BETA CAROTENE, ASCORBIC ACID, CHOLECALCIFEROL, .ALPHA.-TOCOPHEROL ACETATE, DL-, THIAMINE MONONITRATE, RIBOFLAVIN, NIACINAMIDE, PYRIDOXINE HYDROCHLORIDE, FOLIC ACID, CYANOCOBALAMIN, CALCIUM CARBONATE, FERROUS FUMARATE, ZINC OXIDE, CUPRIC OXIDE 3080; 12; 120; 400; 1; 1.84; 3; 20; 22; 920; 25; 200; 27; 10; 2 [IU]/1; UG/1; MG/1; [IU]/1; MG/1; MG/1; MG/1; MG/1; MG/1; [IU]/1; MG/1; MG/1; MG/1; MG/1; MG/1
1 TABLET, FILM COATED ORAL EVERY MORNING
Status: DISCONTINUED | OUTPATIENT
Start: 2018-08-23 | End: 2018-08-23 | Stop reason: HOSPADM

## 2018-08-22 RX ORDER — IBUPROFEN 800 MG/1
800 TABLET ORAL EVERY 8 HOURS PRN
Qty: 30 TAB | Refills: 1 | Status: SHIPPED | OUTPATIENT
Start: 2018-08-22 | End: 2020-10-20

## 2018-08-22 ASSESSMENT — PAIN SCALES - GENERAL
PAINLEVEL_OUTOF10: 3
PAINLEVEL_OUTOF10: 0

## 2018-08-22 NOTE — CARE PLAN
Problem: Venous Thromboembolism (VTW)/Deep Vein Thrombosis (DVT) Prevention:  Goal: Patient will participate in Venous Thrombosis (VTE)/Deep Vein Thrombosis (DVT)Prevention Measures  Outcome: PROGRESSING AS EXPECTED  Pt ambulatory, no current s/s of DVT. Pt encouraged to call if she notices any changes from baseline.    Problem: Pain Management  Goal: Pain level will decrease to patient's comfort goal  Outcome: PROGRESSING AS EXPECTED  Pt states she will let staff know if she requires any additional pain interventions.

## 2018-08-22 NOTE — PROGRESS NOTES
37y/o  edc 2018, EGA 39 2/, Here to l&d room S219 with FOB. C/O rupture of membranes. EFM/TOCO applied, patients states positive fetal movement. Denies vaginal bleeding. SVE complete/+1, room prepped for delivery, DARRYL Franz updated    viable female, apgars 7/9   placenta S/I   report to KATI Cancino RN.

## 2018-08-22 NOTE — CARE PLAN
Problem: Altered physiologic condition related to immediate post-delivery state and potential for bleeding/hemorrhage  Goal: Patient physiologically stable as evidenced by normal lochia, palpable uterine involution and vital signs within normal limits  Outcome: PROGRESSING AS EXPECTED  VSS. Patient denies any dizziness. Fundus firm with light lochia.     Problem: Alteration in comfort related to episiotomy, vaginal repair and/or after birth pains  Goal: Patient is able to ambulate, care for self and infant  Outcome: PROGRESSING AS EXPECTED  Patient ambulating. Caring for self and infant. States she will call when needing pain medication.

## 2018-08-22 NOTE — H&P
"  History and Physical    Gloria Carrion is a 38 y.o. female  -Para:     Gestational Age:  39w3d  Admitted for:   Active Labor  Admitted to  [facility].  [facility]    HPI: Patient is admitted with the above mentioned Chief Complaint and States   Loss of fluid:   positive  Abdominal Pain:  negative  Uterine Contractions:  positive  Vaginal Bleeding:  negative  Fetal Movement:  normal  Patient denies fever, chills, nausea, vomiting , headache, visual disturbance, or dysuria  Patient's last menstrual period was 2017 (lmp unknown).  Estimated Date of Delivery: 18  Final JASMIN: 2018, by Last Menstrual Period    Patient Active Problem List    Diagnosis Date Noted   • Elderly multigravida in third trimester 2018       Admitting DX: Delivery  Labor and delivery, indication for care  Pregnancy Complications:  none  OB Risk Factors:   advanced maternal age  Labor State:    Active phase labor.    History:   has no past medical history on file.     has no past surgical history on file.    OB History    Para Term  AB Living   4 3 3     3   SAB TAB Ectopic Molar Multiple Live Births             3      # Outcome Date GA Lbr Bishnu/2nd Weight Sex Delivery Anes PTL Lv   4 Current            3 Term 10/07/06 40w0d  3.1 kg (6 lb 13.4 oz) F Vag-Spont   ROSALIA   2 Term / 40w0d  2.4 kg (5 lb 4.7 oz) M Vag-Spont   ROSALIA   1 Term 02 40w0d  2.8 kg (6 lb 2.8 oz) M Vag-Spont   ROSALIA          Medications:  No current facility-administered medications on file prior to encounter.      No current outpatient prescriptions on file prior to encounter.       Allergies:  Patient has no known allergies.    ROS:   Neuro: negative    Cardiovascular: negative  Gastro intestinal: negative  Genitourinary: negative            Physical Exam:  BP (!) 98/62   Pulse 69   Temp 36.8 °C (98.3 °F)   Resp 17   Ht 1.549 m (5' 1\")   Wt 69.4 kg (153 lb)  "  LMP 11/19/2017 (LMP Unknown)   SpO2 99%   Breastfeeding? Yes   BMI 28.91 kg/m²   Constitutional: healthy-appearing, Well-developed, well-nourished  No JVD: while supine  HEENT: PERRLA  Breast Exam: negative  Cardio: regular rate and rhythm  Lung: unlabored respirations, no intercostal retractions or accessory muscle use  Abdomen: abdomen is soft without significant tenderness, masses, organomegaly or guarding  Extremity: extremities, peripheral pulses and reflexes normal    Cervical Exam: 100%  Cervix Dilatation: 10  Station: negative 0  Pelvis: Normal  Fetal Assessment: Fetal heart variability: moderate  Fetal Heart Rate decelerations: none  Baseline FHR: 140 per minute  Estimated Fetal Weight: 3000 - 3500g      Labs:  Recent Labs      08/21/18 2000 08/22/18   0254   WBC  15.7*  18.0*   RBC  4.34  3.67*   HEMOGLOBIN  12.7  10.7*   HEMATOCRIT  38.2  32.2*   MCV  88.0  87.7   MCH  29.3  29.2   MCHC  33.2*  33.2*   RDW  45.1  44.9   PLATELETCT  285  243   MPV  10.7  10.5     Prenatal Results     1st Trimester     Test Value Date Time    ABO O  04/20/18 1130    RH POS  04/20/18 1130    Antibody NEG  04/20/18 1130    CBC/PLT/DIFF       HGB 10.7 g/dL (L) 08/22/18 0254    Platelets 243 K/uL 08/22/18 0254    HGB A1C        1 Hr  mg/dL (H) 05/29/18 0938    3 Hr  mg/dL 05/31/18 0810    Rubella 223.60 IU/mL 04/20/18 1118    RPR       Urine Culture       24 Urine Protein        24 Urine Creatinine        HBsAg Negative  04/20/18 1118    Hep CAB        HIV       Gonorrhea       Chlamydia       TSH        Free T4         TB       Pap       SYPHILUS TREP QUAL [id number] [[id number]][             2nd Trimester     Test Value Date Time    HCT 32.2 % (L) 08/22/18 0254    HGB 10.7 g/dL (L) 08/22/18 0254    1 Hr  mg/dL (H) 05/29/18 0938    3 Hr  mg/dL 05/31/18 0810          24-28 Weeks     Test Value Date Time    1 Hr GCT  162 mg/dL (H) 05/29/18 0938    TSH        Free T4        24 Urine Protein       24  Urine Creatinine       BUN       Creatinine       GFR       AST       ALT       Uric Acid       LDH             3rd Trimester     Test Value Date Time    HCT 32.2 % (L) 18 0254    HGB 10.7 g/dL (L) 18 0254    TSH       Free T4       24 Hr Urine Protein       24 Hr Urine Creatinine       SYPHILUS TREP QUAL Non Reactive  18 0938          35-37 Weeks     Test Value Date Time    GBS PCR LB Negative  18 1531    GBS PCR Negative  18 1531          Genetic Screening     Test Value Date Time    Cystic Fibrosis       AFP Quad Screen Neg  18 1118    Sickle Cell                     Assessment:  Gestational Age:  39w3d  Labor State:   Labor, Active  Risk Factors:   advanced maternal age  Pregnancy Complications: none    Patient Active Problem List    Diagnosis Date Noted   • Elderly multigravida in third trimester 2018       Plan:   Admitted for: Active Labor, GBS neg, anticipate .       AMERICO Hinds.

## 2018-08-22 NOTE — PROGRESS NOTES
2039: Report received from GRAHAM Wilson RN. POC discussed.    2300: Pt up to bathroom to void, tolerated well.

## 2018-08-22 NOTE — PROGRESS NOTES
Pt to room 320 via wheelchair infant in arms. Report received from KRISHNA Tamayo. Bands verified. Cuddles tag on and flashing. Patient declines pain at this time. States she will call when needing pain medication.  Assessment done, fundus firm with light lochia. Patient oriented to unit and room. Call light/emergency call light and pink badges discussed. Discussed POC. Whiteboards updated, POC discussed. Call light within reach. Patient encouraged to call with any needs and or concerns.

## 2018-08-22 NOTE — DISCHARGE SUMMARY
Discharge Summary:      Gloria Carrion      Admit Date:   2018  Discharge Date:  2018     Admitting diagnosis:  Delivery  Labor and delivery, indication for care  Discharge Diagnosis: Status post vaginal, spontaneous.  Pregnancy Complications: none  Tubal Ligation:  no        History:  No past medical history on file.  OB History    Para Term  AB Living   4 3 3     3   SAB TAB Ectopic Molar Multiple Live Births             3      # Outcome Date GA Lbr Bishnu/2nd Weight Sex Delivery Anes PTL Lv   4 Current            3 Term 10/07/06 40w0d  3.1 kg (6 lb 13.4 oz) F Vag-Spont   ROSALIA   2 Term 03 40w0d  2.4 kg (5 lb 4.7 oz) M Vag-Spont   ROSALIA   1 Term 02 40w0d  2.8 kg (6 lb 2.8 oz) M Vag-Spont   ROSALIA           Patient has no known allergies.  Patient Active Problem List    Diagnosis Date Noted   • Elderly multigravida in third trimester 2018        Hospital Course:   38 y.o. , now para 4, was admitted with the above mentioned diagnosis, underwent Active Labor, vaginal, spontaneous. Patient postpartum course was unremarkable, with progressive advancement in diet , ambulation and toleration of oral analgesia. Patient without complaints today and desires discharge.      Vitals:    18 0000 18 0400 18 0830 18 2000   BP: 108/61 (!) 98/62 113/61 113/70   Pulse: 69 69 81 82   Resp: 16 17 18 16   Temp: 37 °C (98.6 °F) 36.8 °C (98.3 °F) 36.9 °C (98.4 °F) 36.9 °C (98.4 °F)   SpO2: 96% 99% 94% 100%   Weight:       Height:           Current Facility-Administered Medications   Medication Dose   • oxytocin (PITOCIN) infusion (for postpartum)   mL/hr   • miSOPROStol (CYTOTEC) tablet 800 mcg  800 mcg   • methylergonovine (METHERGINE) injection 0.2 mg  0.2 mg   • ibuprofen (MOTRIN) tablet 600 mg  600 mg   • oxyCODONE-acetaminophen (PERCOCET) 5-325 MG per tablet 1 Tab  1 Tab   • oxyCODONE-acetaminophen (PERCOCET) 5-325 MG per tablet 2 Tab  2 Tab   • ondansetron  (ZOFRAN) syringe/vial injection 4 mg  4 mg   • docusate sodium (COLACE) capsule 100 mg  100 mg   • prenatal plus vitamin (STUARTNATAL 1+1) 27-1 MG tablet 1 Tab  1 Tab   • oxytocin (PITOCIN) infusion (for postpartum)   mL/hr   • ibuprofen (MOTRIN) tablet 600 mg  600 mg   • acetaminophen (TYLENOL) tablet 325 mg  325 mg   • oxyCODONE-acetaminophen (PERCOCET) 5-325 MG per tablet 1 Tab  1 Tab   • oxyCODONE-acetaminophen (PERCOCET-10)  MG per tablet 1 Tab  1 Tab       Exam:  Breast Exam: negative  Abdomen: Abdomen soft, non-tender. BS normal. No masses,  No organomegaly  Fundus Non Tender: no  Incision: none  Perineum: perineum intact  Extremity: extremities, peripheral pulses and reflexes normal     Labs:  Recent Labs      08/21/18 2000 08/22/18   0254   WBC  15.7*  18.0*   RBC  4.34  3.67*   HEMOGLOBIN  12.7  10.7*   HEMATOCRIT  38.2  32.2*   MCV  88.0  87.7   MCH  29.3  29.2   MCHC  33.2*  33.2*   RDW  45.1  44.9   PLATELETCT  285  243   MPV  10.7  10.5        Activity:   Discharge to home  Pelvic Rest x 6 weeks    Assessment:  #normal postpartum course  # hx of PP depression: SW visited and provided resources  Discharge Assessment: Voiding without difficulty, Taking adequate diet and fluids, No heavy bleeding or foul vaginal discharge      Follow up: [facility] or [facility] in 5 weeks for vaginal; To resume daily PNV and iron supplement if needed with hydration.   Patient to RT [facility] or ER if any of the following occur:  Fever over 100.5  Severe abdominal pain  Red streaks or painful masses in the breasts  Foul smelling discharge or lochia  Heavy vaginal bleeding saturating a pad per hour  S/s of PP depression     Discharge Meds:   Current Outpatient Prescriptions   Medication Sig Dispense Refill   • docusate sodium (COLACE) 100 MG Cap Take 1 Cap by mouth 2 times a day. 60 Cap 1   • ibuprofen (MOTRIN) 800 MG Tab Take 1 Tab by mouth every 8 hours as needed. 30 Tab 1       Patricia Rico,  M.D.

## 2018-08-22 NOTE — DISCHARGE PLANNING
:    Received order from RN regarding MOB who has a history of post partum depression.  Per RN, MOB is [place] speaking only.  RN (who is [place] speaking) stated she would provide MOB with the resources and go over information with her.  Provided RN the counseling resource specializing in post partum, pediatrician list, children and family resource list, diaper bank referral, and list of WIC locations.

## 2018-08-22 NOTE — PROGRESS NOTES
Mother Turkish speaking used ipad . Mother's nipple inverted at tip bilaterally, able to hand express colostrum easily from right breast. Mother reports breast fed previous babies, however this baby latching better than other babies. Mother has WIC and plans to follow-up with WIC once discharged. Demo on breast massage & hand express one. Assisted baby to left breast using cross cradle hold, skin to skin, observed deep latch, mother denies pain with latch.     Teaching on  Hunger cues, breastfeeding when baby shows cues or by 3 hours from last feed, importance of skin to skin, getting baby to open wide for deep latch, positioning baby at breast & cluster feeding.     Breastfeeding POC:  Breastfeed on demand or by 3 hours from last feed, lots of skin to skin. F/U with WIC once discharged.

## 2018-08-23 VITALS
TEMPERATURE: 98.4 F | RESPIRATION RATE: 18 BRPM | DIASTOLIC BLOOD PRESSURE: 64 MMHG | OXYGEN SATURATION: 99 % | HEART RATE: 72 BPM | WEIGHT: 153 LBS | SYSTOLIC BLOOD PRESSURE: 102 MMHG | HEIGHT: 61 IN | BODY MASS INDEX: 28.89 KG/M2

## 2018-08-23 PROCEDURE — A9270 NON-COVERED ITEM OR SERVICE: HCPCS | Performed by: PHYSICIAN ASSISTANT

## 2018-08-23 PROCEDURE — 700112 HCHG RX REV CODE 229: Performed by: PHYSICIAN ASSISTANT

## 2018-08-23 PROCEDURE — 700102 HCHG RX REV CODE 250 W/ 637 OVERRIDE(OP): Performed by: PHYSICIAN ASSISTANT

## 2018-08-23 RX ADMIN — IBUPROFEN 600 MG: 600 TABLET, FILM COATED ORAL at 07:57

## 2018-08-23 RX ADMIN — DOCUSATE SODIUM 100 MG: 100 CAPSULE, LIQUID FILLED ORAL at 07:57

## 2018-08-23 RX ADMIN — Medication 1 TABLET: at 07:57

## 2018-08-23 NOTE — DISCHARGE INSTRUCTIONS
POSTPARTUM DISCHARGE INSTRUCTIONS FOR MOM    YOB: 1980   Age: 38 y.o.               Admit Date: 2018     Discharge Date: 2018  Attending Doctor:  Veronika Moralez M.D.                  Allergies:  Patient has no known allergies.    Discharged to home by car. Discharged via wheelchair, hospital escort: Yes.  Special equipment needed: Not Applicable  Belongings with: Personal  Be sure to schedule a follow-up appointment with your primary care doctor or any specialists as instructed.     Discharge Plan:   Influenza Vaccine Indication: Not indicated: Previously immunized this influenza season and > 8 years of age    REASONS TO CALL YOUR OBSTETRICIAN:  1.   Persistent fever or shaking chills (Temperature higher than 100.4)  2.   Heavy bleeding (soaking more than 1 pad per hour); Passing clots  3.   Foul odor from vagina  4.   Mastitis (Breast infection; breast pain, chills, fever, redness)  5.   Urinary pain, burning or frequency  6.   Episiotomy infection  7.   Abdominal incision infection  8.   Severe depression longer than 24 hours    HAND WASHING  · Prior to handling the baby.  · Before breastfeeding or bottle feeding baby.  · After using the bathroom or changing the baby's diaper.    WOUND CARE  Ask your physician for additional care instructions.  In general:    ·  Incision:      · Keep clean and dry.    · Do NOT lift anything heavier than your baby for up to 6 weeks.    · There should not be any opening or pus.      VAGINAL CARE  · Nothing inside vagina for 6 weeks: no sexual intercourse, tampons or douching.  · Bleeding may continue for 2-4 weeks.  Amount may vary.    · Call your physician for heavy bleeding which means soaking more than 1 pad per hour    BIRTH CONTROL  · It is possible to become pregnant at any time after delivery and while breastfeeding.  · Plan to discuss a method of birth control with your physician at your follow up visit. visit.    DIET AND  "ELIMINATION  · Eating more fiber (bran cereal, fruits, and vegetables) and drinking plenty of fluids will help to avoid constipation.  · Urinary frequency after childbirth is normal.    POSTPARTUM BLUES  During the first few days after birth, you may experience a sense of the \"blues\" which may include impatience, irritability or even crying.  These feeling come and go quickly.  However, as many as 1 in 10 women experience emotional symptoms known as postpartum depression.    Postpartum depression:  May start as early as the second or third day after delivery or take several weeks or months to develop.  Symptoms of \"blues\" are present, but are more intense:  Crying spells; loss of appetite; feelings of hopelessness or loss of control; fear of touching the baby; over concern or no concern at all about the baby; little or no concern about your own appearance/caring for yourself; and/or inability to sleep or excessive sleeping.  Contact your physician if you are experiencing any of these symptoms.    Crisis Hotline:  · [place] Crisis Hotline:  6-252-YVQRUBA  Or 1-780.988.5608  · [place] Crisis Hotline:  1-351.368.6654  Or 813-478-1508    PREVENTING SHAKEN BABY:  If you are angry or stressed, PUT THE BABY IN THE CRIB, step away, take some deep breaths, and wait until you are calm to care for the baby.  DO NOT SHAKE THE BABY.  You are not alone, call a supporter for help.    · Crisis Call Center 24/7 crisis line 589-206-0078 or 1-466.865.8929  · You can also text them, text \"ANSWER\" to 842263    QUIT SMOKING/TOBACCO USE:  I understand the use of any tobacco products increases my chance of suffering from future heart disease and could cause other illnesses which may shorten my life. Quitting the use of tobacco products is the single most important thing I can do to improve my health. For further information on smoking / tobacco cessation call a Toll Free Quit Line at 1-231.621.9168 (*National Cancer [place]) or " 1-680.748.9573 (American Lung Association) or you can access the web based program at www.lungusa.org.    · [place] Tobacco Users Help Line:  (981) 122-2825       Toll Free: 1-148.574.1827  · Quit Tobacco Program [facility] (605)334-8880    DEPRESSION / SUICIDE RISK:  As you are discharged from this [facility], it is important to learn how to keep safe from harming yourself.    Recognize the warning signs:  · Abrupt changes in personality, positive or negative- including increase in energy   · Giving away possessions  · Change in eating patterns- significant weight changes-  positive or negative  · Change in sleeping patterns- unable to sleep or sleeping all the time   · Unwillingness or inability to communicate  · Depression  · Unusual sadness, discouragement and loneliness  · Talk of wanting to die  · Neglect of personal appearance   · Rebelliousness- reckless behavior  · Withdrawal from people/activities they love  · Confusion- inability to concentrate     If you or a loved one observes any of these behaviors or has concerns about self-harm, here's what you can do:  · Talk about it- your feelings and reasons for harming yourself  · Remove any means that you might use to hurt yourself (examples: pills, rope, extension cords, firearm)  · Get professional help from the community (Mental Health, Substance Abuse, psychological counseling)  · Do not be alone:Call your Safe Contact- someone whom you trust who will be there for you.  · Call your local CRISIS HOTLINE 065-9026 or 311-631-6476  · Call your local Children's Mobile Crisis Response Team [place] (803) 586-0646 or www.Hydrobee  · Call the toll free National Suicide Prevention Hotlines   · National Suicide Prevention Lifeline 460-199-YEKY (1648)  · National Hope Line Network 800-SUICIDE (461-0257)    DISCHARGE SURVEY:  Thank you for choosing [facility].  We hope we provided you with very good care.  You may be  receiving a survey in the mail.  Please fill it out.  Your opinion is valuable to us.    ADDITIONAL EDUCATIONAL MATERIALS GIVEN TO PATIENT:      DISCHARGE INSTRUCTIONS (Cameroonian) POSTPARTUM FOR MOM      DMITRI LAS JEIMY:  · Antes de tocar el bebé.  · Antes del amamantamiento o darle al bebé lactancia artificial.  · Después de usar el baño.    CUIDADO DE LAS HERIDAS:  · La Incisión de la Operación Cesárea:  Mantenga limpea y seca, NO levante nada que pesa más que hoffman bebé por 6 semenas.  No debe ninguna apertura ni pus.  · Episiotomía/Elias Vaginal:  Use un spray de Tucks o Dermoplast, tome un baño de asiento cuando necesite (6 pulgadas), siga usando la botella Lauren hasta que pare de sangrar.    CUIDADA DEL SENO:  · Lleve un sostén.  · Si esta` amamantando no use jabón en el seno ni en los pezones.  · Aplique lanolina si los pezones se ponen quebrazados y si le duelen.    CUIDADO DE LA VAGINA:  · [place] puede entrar la vagina por 6 semanas:  Actividad sexual, Ducha vaginal, Tampones.  · El sangramiento puede seguir por 2 a 4 semanas.  La cantidad es variable.  Llame a hoffman med`ico(a) obstetra si hay mucha vivien (si usa ma`s de ellen toalla femenina cada hora).    CONTRACEPCIÒN:  · Es posible embarazarse en cualquier tiempo despus del parto y mientras el amamantamiento.  · Debe planear de discutir los metodos de cantracepción con hoffman médico(a) cuando vuelva en 6 semanas para hoffman revisión médica.    DIETA Y DEFECACÒN:  · Para evitar la constipación coma mas fibra (cereal salvado, fruta, y verduras) Y tome muchos liquidos.   · Es común orinar frecuentemente después del parto.    POSTPARTO Y LA DEPRESÒN:  Sonam los primeros marino después del parto es común sentirse:  · Impaciente, irritable, o llorar  Estos sentimientos llegan y van rapidamente.  No obstante, veena ellen de cada jaimie mujeres tiene síntomas emocionales conocidos gonzalez depresión postparto.  · Despresión Postparto:  Puede empezar tan pronto gonzalez el massimo o tercer día  después del parto o puede durar algunas semanas o meses para desarrollar.  Síntomas de la depresión pueden presentarse, palmer son más intensos:  · Pérdida del hambre  · Frecuencia de llorar  · Sentirse desesperada o perder el control  · Demasiada o no suficiente preocupación con hoffman bebé  · Tener miedo de tocar el bebé  · No preocuparse con hoffman propia apariencia  · Incapacidad de dormir o dormir excesivamente    DEPRESIÒN / RIESGO AL SUICIDIO:    Cuando se le da de kristopher de alguna entidad de [facility] [employer], es importante aprender a mantener a dorothy de hacerse daño a sí mismo.    Reconocer los signos de advertencia:  · Cambios bruscos en la peronalidad, positiva o negativa, incluyendo aumento de la energia  · Regalar posesiones  · Cambios en patrones de comer - significativos cambios de peso - positivos o negativos  · Cambio de patrones para dormir - no poder dormir o dormir todo el tiempo  · Falta de voluntad o incapacidad de comunicarse  · Depresión  · Edda, desánimo y tristeza inusual  · Habla de querer morir  · Descuido del aspecto personal  · Rebeldía - comportamiento imprudente  · Retiro de personas y actividades que les gusta  · Confusión-incapacidad para concentrarse    Si usted o un ser querido observa cualquiera de estos comportamientos o tiene preocupaciones de hacerse daño a si mismo, aquí le damos lo que usted puede hacer:  · Hablar de ti - tus sentimientos y razones para hacerse paola a si mismo  · Retire cualquier medio que se podría utilizar para lastimarse (ejemplos: píldoras, cuerdas, cordones de extensión, arma de ryan)  · Obtenga ayuda profesional de la comunidad (chava mental, abuso de sustancias, orientación psicológica)  · No estar solo: llamar a un contacto seguro - ellen persona que confía en que estará allí por usted  · Llamada local L´INEA de CRISIS [phone] y [phone]  · Llamada local [employer] [facility] Crisis [facility] ([phone] or  www.Zipscene.com  · Llamada gratuita nacional, líneas de prevención del suicidio  · Preventión del Suicidio Nacional Lifeline 291-421-KDLL (4414)  · Línea Nacional de la [place] 800-SUICIDE (754-8830)       ([employer])            My signature on this form indicates that:  1.  I have reviewed and understand the above information  2.  My questions regarding this information have been answered to my satisfaction.  3.  I have formulated a plan with my discharge nurse to obtain my prescribed medication for home.

## 2018-08-23 NOTE — PROGRESS NOTES
1900 Received bedside report from KRISHNA Zayas. Discussed plan of care. Patient will call for pain medication as needed. All needs met at this time.

## 2018-08-23 NOTE — PROGRESS NOTES
[facility] POSTPARTUM PROGRESS NOTE    PATIENT ID:  NAME:  Gloria Carrion  MRN:               [id number]  YOB: 1980     38 y.o. female admitted  now  at 39w4d PPD#2 s/p     Subjective: Abdominal pain: no  Ambulating: yes  Tolerating liquids: yes  Tolerating regular diet: yes  Flatus: yes  BM: no  Bleeding: yes  Voiding: yes  Dizziness: no  Breast feeding: yes  Breast tenderness: no    Objective:    Vitals:    18 0000 18 0400 18 0830 18   BP: 108/61 (!) 98/62 113/61 113/70   Pulse: 69 69 81 82   Resp: 16    Temp: 37 °C (98.6 °F) 36.8 °C (98.3 °F) 36.9 °C (98.4 °F) 36.9 °C (98.4 °F)   SpO2: 96% 99% 94% 100%   Weight:       Height:         General: No acute distress, resting comfortably in bed.  HEENT: normocephalic, nontraumatic, PERRLA, EOMI  Cardiovascular: Heart RRR with no murmurs, rubs or gallops. Distal Pulses 2+  Respiratory: symmetric chest expansion, lungs CTA bilaterally with no wheezes rales or rhonci  Abdomen: soft, mildly tender, fundus firm, +BS  Genitourinary: lochia light, denies excessive vaginal bleeding  Musculoskeletal: strength 5/5 in four extremities  Neuro: non focal with no numbness, tingling or changes in sensation    Recent Labs      18   0254   WBC  15.7*  18.0*   RBC  4.34  3.67*   HEMOGLOBIN  12.7  10.7*   HEMATOCRIT  38.2  32.2*   MCV  88.0  87.7   MCH  29.3  29.2   RDW  45.1  44.9   PLATELETCT  285  243   MPV  10.7  10.5   NEUTSPOLYS  73.40*   --    LYMPHOCYTES  16.10*   --    MONOCYTES  8.10   --    EOSINOPHILS  1.10   --    BASOPHILS  0.30   --      No results for input(s): SODIUM, POTASSIUM, CHLORIDE, CO2, GLUCOSE, BUN, CPKTOTAL in the last 72 hours.    Current Meds:   Current Facility-Administered Medications   Medication Dose Frequency Provider Last Rate Last Dose   • oxytocin (PITOCIN) infusion (for postpartum)   mL/hr Continuous Choco Franz, P.A. 125 mL/hr at 18 0000 125  mL/hr at 18 0000   • miSOPROStol (CYTOTEC) tablet 800 mcg  800 mcg PRN Choco Franz, P.A.       • methylergonovine (METHERGINE) injection 0.2 mg  0.2 mg PRN Choco Franz, P.A.       • ibuprofen (MOTRIN) tablet 600 mg  600 mg Q6HRS PRN Choco Franz, P.A.       • oxyCODONE-acetaminophen (PERCOCET) 5-325 MG per tablet 1 Tab  1 Tab Q4HRS PRN Choco QUINTERO. Osei, P.A.       • oxyCODONE-acetaminophen (PERCOCET) 5-325 MG per tablet 2 Tab  2 Tab Q4HRS PRN Choco Franz, P.A.       • ondansetron (ZOFRAN) syringe/vial injection 4 mg  4 mg Q6HRS PRN Choco Franz, P.A.       • docusate sodium (COLACE) capsule 100 mg  100 mg BID PRN Choco Franz, P.A.       • prenatal plus vitamin (STUARTNATAL 1+1) 27-1 MG tablet 1 Tab  1 Tab QAM Choco Franz, P.A.       • oxytocin (PITOCIN) infusion (for postpartum)   mL/hr Continuous Choco Franz, P.A. 125 mL/hr at 18 2100 125 mL/hr at 18 2100   • ibuprofen (MOTRIN) tablet 600 mg  600 mg Q6HRS PRN Choco Franz, P.A.       • acetaminophen (TYLENOL) tablet 325 mg  325 mg Q4HRS PRN Choco Franz, P.A.       • oxyCODONE-acetaminophen (PERCOCET) 5-325 MG per tablet 1 Tab  1 Tab Q4HRS PRN Choco Franz, P.A.       • oxyCODONE-acetaminophen (PERCOCET-10)  MG per tablet 1 Tab  1 Tab Q4HRS PRN Choco QUINTERO. Osei, P.A.         Last reviewed on 2018  8:41 PM by Jair Wilson R.N.     Assessment:  38 y.o. female admitted  now  at 39w4d PPD#2 s/p     Plan:   1. Discharged canceled because baby was not discharged  2. Mom with Hx of PP depression: SW consulted and provided resources  3. Home today please d/c summary from     Patricia Rico M.D.

## 2018-08-23 NOTE — DISCHARGE PLANNING
:    Discharge Planning Assessment Post Partum    Reason for Referral: Questions about insurance and resources.  Address: [address] [place] [address] [place] [address]  Phone: [phone]  Type of Living Situation: living with FOB and children  Mom Diagnosis: Pregnancy  Baby Diagnosis: [place]  Primary Language: Kazakh only, the [employer]  was used    Father of the Baby: Joann [place] Mukherjee   Involved in baby’s care? Yes  Contact Information: [phone]    Prenatal Care: Yes  Mom's PCP: None  PCP for new baby:Pediatrician list provided    Support System: FOB  Coping/Bonding between mother & baby: Yes  Source of Feeding: Breast feeding  Supplies for Infant: Prepared    Mom's Insurance: Self-pay.  Spoke with Eliot who will contact family  Baby Covered on Insurance:N/A  Mother Employed/School: No  Other children in the home/names & ages: Parents have 3 other children    Financial Hardship/Income: No insurance, FOB is working at [employer]   Mom's Mental status: A&Ox4  Services used prior to admit: WIC    CPS History: No  Psychiatric History: No  Domestic Violence History: No  Drug/ETOH History: No    Resources Provided: Pediatrician list, children and family resource list  Referrals Made: diaper bank referral provided     Clearance for Discharge: Infant is cleared to discharge home with parents.

## 2018-10-01 NOTE — ADDENDUM NOTE
Encounter addended by: Stefani Villa R.N. on: 10/1/2018  9:17 AM<BR>    Actions taken: Flowsheet accepted

## 2019-04-03 NOTE — TELEPHONE ENCOUNTER
Notes recorded by Disha Velasco C.N.M. on 5/29/2018 at 1:46 PM PDT  Abnormal 1hr GTT - needs 3hr asap.    Pt notified of abnormal 1hr gtt and need to do 3hr gtt this time. Pt instructed to fast 10-12 hrs  pt only allow to drink plain water during fasting time. Pt will call lab to schedule an appt. Advised to bring a snack for after the test is done. Pt notified will be staying in the labs for the 3hr. Pt agreed to do it 3/31/18. Pt verbalized understanding.         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 Work up with Dr. Chet Malloy negative thus far.  She will keep follow up with heme-onc

## 2020-09-02 ENCOUNTER — GYNECOLOGY VISIT (OUTPATIENT)
Dept: OBGYN | Facility: CLINIC | Age: 40
End: 2020-09-02

## 2020-09-02 VITALS
HEIGHT: 60 IN | DIASTOLIC BLOOD PRESSURE: 60 MMHG | WEIGHT: 146.2 LBS | BODY MASS INDEX: 28.7 KG/M2 | SYSTOLIC BLOOD PRESSURE: 108 MMHG

## 2020-09-02 DIAGNOSIS — N93.8 DUB (DYSFUNCTIONAL UTERINE BLEEDING): ICD-10-CM

## 2020-09-02 DIAGNOSIS — O21.9 NAUSEA AND VOMITING IN PREGNANCY: ICD-10-CM

## 2020-09-02 DIAGNOSIS — Z32.01 POSITIVE PREGNANCY TEST: ICD-10-CM

## 2020-09-02 LAB
INT CON NEG: NEGATIVE
INT CON POS: POSITIVE
POC URINE PREGNANCY TEST: POSITIVE

## 2020-09-02 PROCEDURE — 76830 TRANSVAGINAL US NON-OB: CPT | Performed by: OBSTETRICS & GYNECOLOGY

## 2020-09-02 PROCEDURE — 81025 URINE PREGNANCY TEST: CPT | Performed by: OBSTETRICS & GYNECOLOGY

## 2020-09-02 PROCEDURE — 99203 OFFICE O/P NEW LOW 30 MIN: CPT | Mod: 25 | Performed by: OBSTETRICS & GYNECOLOGY

## 2020-09-02 RX ORDER — ONDANSETRON 4 MG/1
4 TABLET, FILM COATED ORAL EVERY 6 HOURS PRN
Qty: 20 EACH | Refills: 2 | Status: SHIPPED | OUTPATIENT
Start: 2020-09-02 | End: 2020-09-09

## 2020-09-02 NOTE — NON-PROVIDER
Patient here for GYN/DUB.  UPT= postive  LMP= 5/25/2020  JASMIN= 03/01/21  GA= 14w2d  Last pap 03/20/2018 WNL  Phone number: [phone]  Pharmacy verified  C/o pt states having some nausea and vomiting. Pt states that last week she did have a little bit of blood when she wiped, she states it only happened once.

## 2020-09-02 NOTE — PROGRESS NOTES
Subjective:      Gloria Carrion is a 40 y.o. female who presents with Gynecologic Exam (dub)            HPI patient is a 40-year-old  5 para 4 who presents today with amenorrhea and positive home pregnancy test.  Pregnancy was unplanned.  Patient was not using any birth control.  Her only symptoms are some intermittent nausea and vomiting but states for the past 2 days she has had persistent symptoms.  She denies any pain or bleeding.    ROS all organ systems are reviewed and were negative except for complaints in HPI       Objective:     /60   Ht 1.524 m (5')   Wt 66.3 kg (146 lb 3.2 oz)   BMI 28.55 kg/m²      Physical Exam  Vitals signs and nursing note reviewed. Exam conducted with a chaperone present.   Constitutional:       Appearance: Normal appearance. She is obese. She is not toxic-appearing.   HENT:      Head: Normocephalic and atraumatic.   Neck:      Musculoskeletal: Normal range of motion and neck supple. No neck rigidity.   Cardiovascular:      Rate and Rhythm: Normal rate and regular rhythm.      Pulses: Normal pulses.      Heart sounds: Normal heart sounds. No murmur.   Pulmonary:      Effort: Pulmonary effort is normal. No respiratory distress.      Breath sounds: Normal breath sounds. No wheezing.   Abdominal:      General: Abdomen is flat. Bowel sounds are normal. There is no distension.      Palpations: Abdomen is soft.      Tenderness: There is no abdominal tenderness.   Genitourinary:     General: Normal vulva.   Musculoskeletal: Normal range of motion.      Right lower leg: No edema.      Left lower leg: No edema.   Lymphadenopathy:      Cervical: No cervical adenopathy.   Skin:     General: Skin is warm and dry.   Neurological:      General: No focal deficit present.      Mental Status: She is alert and oriented to person, place, and time.   Psychiatric:         Mood and Affect: Mood normal.         Behavior: Behavior normal.         Thought Content: Thought content  normal.         Judgment: Judgment normal.            Ultrasound was performed and read by me:    Peterson intrauterine pregnancy in variable presentation noted  Fetal heart rate was 141 bpm  Fetal biometry measurements give a gestational age of 14 weeks and 2 days  EDC by fetal biometry measurement is 3/1/2021  EDC by LMP is 3/1/2021  No adnexal masses noted    Impression: Peterson intrauterine pregnancy at 14 weeks and 2 days gestation by LMP consistent with ultrasound findings today with EDC of 3/1/2021     Assessment/Plan:        1. DUB (dysfunctional uterine bleeding)  Patient presents with amenorrhea and positive home pregnancy test.  Normal intrauterine pregnancy confirmed today at 14 weeks and 2 days gestation.  Findings were discussed.  Pregnancy precautions and restrictions were reviewed  - POCT Pregnancy    2. Positive pregnancy test      3. Nausea and vomiting in pregnancy  Patient counseled on nausea and vomiting in pregnancy and treatment options including over-the-counter treatment and dietary modification.  Instructions were provided.  She will try these measures and if symptoms have not improved, she will try Zofran.  She was counseled on Zofran usage and agrees with usage  - ondansetron (ZOFRAN) 4 MG Tab tablet; Take 1 Tab by mouth every 6 hours as needed for Nausea/Vomiting for up to 7 days.  Dispense: 20 Each; Refill: 2    4.  Advanced maternal age discussed and risks were discussed.  We discussed genetic screening/testing options.  Patient will decide at her next visit    5.  Precautions and plan of care reviewed.  Patient to follow-up in 1 week for new OB

## 2020-09-08 ENCOUNTER — APPOINTMENT (OUTPATIENT)
Dept: OBGYN | Facility: CLINIC | Age: 40
End: 2020-09-08

## 2020-09-22 ENCOUNTER — HOSPITAL ENCOUNTER (OUTPATIENT)
Facility: MEDICAL CENTER | Age: 40
End: 2020-09-22
Attending: NURSE PRACTITIONER
Payer: COMMERCIAL

## 2020-09-22 ENCOUNTER — INITIAL PRENATAL (OUTPATIENT)
Dept: OBGYN | Facility: CLINIC | Age: 40
End: 2020-09-22

## 2020-09-22 VITALS
HEIGHT: 59 IN | WEIGHT: 146 LBS | DIASTOLIC BLOOD PRESSURE: 60 MMHG | SYSTOLIC BLOOD PRESSURE: 114 MMHG | BODY MASS INDEX: 29.43 KG/M2

## 2020-09-22 DIAGNOSIS — O09.529 ENCOUNTER FOR SUPERVISION OF HIGH-RISK PREGNANCY WITH ELDERLY MULTIGRAVIDA: Primary | ICD-10-CM

## 2020-09-22 PROBLEM — O09.523 ELDERLY MULTIGRAVIDA IN THIRD TRIMESTER: Status: RESOLVED | Noted: 2018-03-20 | Resolved: 2020-09-22

## 2020-09-22 PROCEDURE — 8198 PREG CTR PKG RATE (SYSTEM): Performed by: NURSE PRACTITIONER

## 2020-09-22 PROCEDURE — 0500F INITIAL PRENATAL CARE VISIT: CPT | Performed by: NURSE PRACTITIONER

## 2020-09-22 ASSESSMENT — EDINBURGH POSTNATAL DEPRESSION SCALE (EPDS)
THE THOUGHT OF HARMING MYSELF HAS OCCURRED TO ME: NEVER
I HAVE BEEN ANXIOUS OR WORRIED FOR NO GOOD REASON: NO, NOT AT ALL
THINGS HAVE BEEN GETTING ON TOP OF ME: NO, MOST OF THE TIME I HAVE COPED QUITE WELL
TOTAL SCORE: 1
I HAVE BEEN SO UNHAPPY THAT I HAVE HAD DIFFICULTY SLEEPING: NOT AT ALL
I HAVE FELT SAD OR MISERABLE: NO, NOT AT ALL
I HAVE BEEN SO UNHAPPY THAT I HAVE BEEN CRYING: NO, NEVER
I HAVE BEEN ABLE TO LAUGH AND SEE THE FUNNY SIDE OF THINGS: AS MUCH AS I ALWAYS COULD
I HAVE BLAMED MYSELF UNNECESSARILY WHEN THINGS WENT WRONG: NO, NEVER
I HAVE FELT SCARED OR PANICKY FOR NO GOOD REASON: NO, NOT AT ALL
I HAVE LOOKED FORWARD WITH ENJOYMENT TO THINGS: AS MUCH AS I EVER DID

## 2020-09-22 ASSESSMENT — ENCOUNTER SYMPTOMS
EYES NEGATIVE: 1
CONSTITUTIONAL NEGATIVE: 1
NEUROLOGICAL NEGATIVE: 1
MUSCULOSKELETAL NEGATIVE: 1
CARDIOVASCULAR NEGATIVE: 1
PSYCHIATRIC NEGATIVE: 1
GASTROINTESTINAL NEGATIVE: 1
RESPIRATORY NEGATIVE: 1

## 2020-09-22 NOTE — PROGRESS NOTES
Pt. Here for NOB visit today.  # [phone]  First prenatal care  Pt. States no complaints   +FM   Pharmacy verified  Pt declines AFP  Pt declines CF   Chaperone offered and not indicated  Last PAP 3/22/2020, WNL

## 2020-09-22 NOTE — PROGRESS NOTES
"Subjective:      S:  Gloria Carrion is a 40 y.o.  female  @ She is 17w1d with an JASMIN of Estimated Date of Delivery: 3/1/21 based off of US who presents for her new OB exam.      Her OB hx includes 4 .   History of HSV I or II in self or partner: no  History of STIs in self or partner: no  History of Thyroid problems: no    NO ER visits or previous care in this pregnancy. She has no complaints.  Declines AFP.  Declines CF. Declines MFM referral for AMa Reports positive FM, no VB, LOF, or cramping.  Denies dysuria, vaginal DC.  Pt is  and lives with family. FOB is supportive. She is currently working as homemaker, no heavy lifting, no chemical exposure.  Pregnancy is unplanned but welcomed.    O:    Vitals:    20 1543   BP: 114/60   Weight: 66.2 kg (146 lb)   Height: 1.51 m (4' 11.45\")    See H&P Prenatal Physical.  Wet mount: not indicated        FHTs: 140        Fundal ht: 17cm     A:   1.  IUP @ 17w1d JASMIN: Estimated Date of Delivery: 3/1/21 per US         2.  S=D        3.    Patient Active Problem List    Diagnosis Date Noted   • Encounter for supervision of high-risk pregnancy with elderly multigravida 2020         P:  1.  GC/CT done. Pap negative in 2018.         2.  Prenatal labs and UDS ordered - lab slip given        3.  Discussed diet and exercise during pregnancy. Encouraged good nutrition, and daily exercise including walking or swimming. Discussed expected weight gain during pregnancy.              4.  Discussed adequate hydration during pregnancy.        5.  NOB packet given        6.  Return to office in 4 wks        7.  Complete OB US in 2-3 wks        8.  Pregnancy guide provided        9.  Childbirth education discussed. Not a candidate for Centering Pregnancy      HPI    Review of Systems   Constitutional: Negative.    HENT: Negative.    Eyes: Negative.    Respiratory: Negative.    Cardiovascular: Negative.    Gastrointestinal: Negative.  " "  Genitourinary: Negative.    Musculoskeletal: Negative.    Skin: Negative.    Neurological: Negative.    Endo/Heme/Allergies: Negative.    Psychiatric/Behavioral: Negative.           Objective:     /60   Ht 1.51 m (4' 11.45\")   Wt 66.2 kg (146 lb)   LMP 05/25/2020   BMI 29.04 kg/m²      Physical Exam  Vitals signs and nursing note reviewed.   Constitutional:       Appearance: She is well-developed.   Neck:      Musculoskeletal: Normal range of motion and neck supple.   Cardiovascular:      Rate and Rhythm: Normal rate and regular rhythm.      Heart sounds: Normal heart sounds.   Pulmonary:      Effort: Pulmonary effort is normal.      Breath sounds: Normal breath sounds.   Abdominal:      Palpations: Abdomen is soft.   Genitourinary:     Vagina: Normal.      Comments: Uterus enlarged, c/w 17 wk ga  Musculoskeletal: Normal range of motion.   Skin:     General: Skin is warm and dry.   Neurological:      Mental Status: She is alert and oriented to person, place, and time.      Deep Tendon Reflexes: Reflexes are normal and symmetric.   Psychiatric:         Behavior: Behavior normal.         Thought Content: Thought content normal.         Judgment: Judgment normal.                 Assessment/Plan:        1. Encounter for supervision of high-risk pregnancy with elderly multigravida    - PREG CNTR PRENATAL PN; Future  - Chlamydia/GC PCR Urine Or Swab; Future  - URINE DRUG SCREEN W/CONF (AR); Future  - HEP C VIRUS ANTIBODY; Future  - AFP TETRA; Future  - US-OB 2ND 3RD TRI COMPLETE; Future    "

## 2020-09-23 DIAGNOSIS — O09.529 ENCOUNTER FOR SUPERVISION OF HIGH-RISK PREGNANCY WITH ELDERLY MULTIGRAVIDA: ICD-10-CM

## 2020-09-23 LAB
C TRACH DNA SPEC QL NAA+PROBE: NEGATIVE
N GONORRHOEA DNA SPEC QL NAA+PROBE: NEGATIVE
SPECIMEN SOURCE: NORMAL

## 2020-10-13 ENCOUNTER — APPOINTMENT (OUTPATIENT)
Dept: RADIOLOGY | Facility: IMAGING CENTER | Age: 40
End: 2020-10-13
Attending: NURSE PRACTITIONER

## 2020-10-13 DIAGNOSIS — O09.529 ENCOUNTER FOR SUPERVISION OF HIGH-RISK PREGNANCY WITH ELDERLY MULTIGRAVIDA: ICD-10-CM

## 2020-10-13 PROCEDURE — 76805 OB US >/= 14 WKS SNGL FETUS: CPT | Mod: TC | Performed by: NURSE PRACTITIONER

## 2020-10-14 ENCOUNTER — TELEPHONE (OUTPATIENT)
Dept: OBGYN | Facility: CLINIC | Age: 40
End: 2020-10-14

## 2020-10-14 DIAGNOSIS — O28.3 ABNORMAL OBSTETRIC ULTRASOUND SCAN: ICD-10-CM

## 2020-10-14 NOTE — TELEPHONE ENCOUNTER
----- Message from MERVAT Riley sent at 10/14/2020 11:30 AM PDT -----  Let pt know that the US showed a bright spot on the heart which can be a soft sign for Down's. It is important for her to have an AFP drawn. If the AFP is normal, this would be an incidental finding. Order has been placed.      Called pt and notified of US finding and recommendation to do AFP ASAP explained to pt if AFP comes back negative it could be consider an incidental finding. Pt agreed to do AFP, advised to do AFP and PNP all together. Pt agreed.

## 2020-10-16 ENCOUNTER — HOSPITAL ENCOUNTER (OUTPATIENT)
Dept: LAB | Facility: MEDICAL CENTER | Age: 40
End: 2020-10-16
Attending: NURSE PRACTITIONER
Payer: COMMERCIAL

## 2020-10-16 DIAGNOSIS — O09.529 ENCOUNTER FOR SUPERVISION OF HIGH-RISK PREGNANCY WITH ELDERLY MULTIGRAVIDA: ICD-10-CM

## 2020-10-16 DIAGNOSIS — O28.3 ABNORMAL OBSTETRIC ULTRASOUND SCAN: ICD-10-CM

## 2020-10-16 LAB — HCV AB SER QL: NORMAL

## 2020-10-20 ENCOUNTER — ROUTINE PRENATAL (OUTPATIENT)
Dept: OBGYN | Facility: CLINIC | Age: 40
End: 2020-10-20

## 2020-10-20 VITALS — BODY MASS INDEX: 29.7 KG/M2 | DIASTOLIC BLOOD PRESSURE: 68 MMHG | WEIGHT: 149.3 LBS | SYSTOLIC BLOOD PRESSURE: 120 MMHG

## 2020-10-20 DIAGNOSIS — O09.529 ENCOUNTER FOR SUPERVISION OF HIGH-RISK PREGNANCY WITH ELDERLY MULTIGRAVIDA: Primary | ICD-10-CM

## 2020-10-20 LAB
# FETUSES US: NORMAL
AFP MOM SERPL: 0.98
AFP SERPL-MCNC: 62 NG/ML
AGE - REPORTED: 39.6 YR
CURRENT SMOKER: NO
FAMILY MEMBER DISEASES HX: NO
GA METHOD: NORMAL
GA: NORMAL WK
HCG MOM SERPL: 0.39
HCG SERPL-ACNC: 8194 IU/L
HX OF HEREDITARY DISORDERS: NO
IDDM PATIENT QL: NO
INHIBIN A MOM SERPL: 0.62
INHIBIN A SERPL-MCNC: 111 PG/ML
INTEGRATED SCN PATIENT-IMP: NORMAL
PATHOLOGY STUDY: NORMAL
SPECIMEN DRAWN SERPL: NORMAL
U ESTRIOL MOM SERPL: 0.91
U ESTRIOL SERPL-MCNC: 2.41 NG/ML

## 2020-10-20 PROCEDURE — 90040 PR PRENATAL FOLLOW UP: CPT | Performed by: NURSE PRACTITIONER

## 2020-10-20 NOTE — PATIENT INSTRUCTIONS
P:  1.  Reviewed labs, ultrasound w pt.          2.  Questions answered.          3.  Encouraged adequate water intake        4.  F/u 4 wks.        5.  Repeat PNP ordered, lab slip given to pt.         6.  Flu vaccine declined today.

## 2020-10-20 NOTE — PROGRESS NOTES
S:  Pt is  at 21w1d here for routine OB follow up.  No c/o today.  Reports good FM.  Denies VB, LOF, RUCs, or vaginal DC.  Denies cough, SOB, sore throat or fever.  Denies exposure to anyone with COVID 19.    O:    Vitals:    10/20/20 1035   BP: 120/68   Weight: 67.7 kg (149 lb 4.8 oz)           FHTs: 155        Fundal ht: 21 cm.        AFP: pending -- reviewed w pt.    Complete OB US  10/13/2020 8:00 AM     HISTORY/REASON FOR EXAM:  Evaluate fetal anatomy     TECHNIQUE/EXAM DESCRIPTION: OB complete ultrasound.     COMPARISON:  None     FINDINGS:  Fetal Lie:  Vertex  LMP:  2020  Clinical JASMIN by LMP:  3/1/2021     Placenta (Location):  Anterior  Placenta Previa: No  Placental Grade: I     Amniotic Fluid Volume:  CHUCHO = 15.01 cm     Fetal Heart Rate:  146 bpm     Cervical Length:  4.20 cm transabdominal     No maternal adnexal mass is identified.     Umbilical Artery S/D Ratio(s):  Not applicable     Fetal Anatomy  (Seen or Not Seen)  Lateral Ventricles     Seen  Cisterna Magna        Seen  Cerebellum              Seen  CSP             Seen  Orbits             Seen  Face/Lips                Seen  Cord Insertion         Seen  Placental CI         Seen  4 Chamber Heart     Seen  LVOT               Seen  RVOT              Seen  3 Vessel View     Seen  Stomach       Seen  Kidneys                   Seen  Urinary Bladder      Seen  Spine                       Seen  3 Vessel Cord          Seen  Both Upper Extremities    Seen  Both Lower Extremities    Seen  Diaphragm             Seen  Movement       Seen  Gender:  Likely female     Fetal Biometry  BPD    4.63 cm, 20 weeks, (43.4%)  HC    17.08 cm, 19 weeks, 5 days, (21.8%)  AC    14.47 cm, 19 weeks, 6 days, (32.4%)  Femur Length    3.00 cm, 19 weeks, 2 days, (15.0%)  Humerus Length    2.94 cm, 19 weeks, 4 days, (33.6%)  Cerebellum Diameter   2.13 cm, 20 weeks, 1 day, (75.1%)     EGA by this US:  19 weeks, 5 days  JASMIN by this US: 3/4/2021  JASMIN by 1st US:  3/1/2021  by MD     Estimated Fetal Weight:  300 grams  EFW Percentile: 18.3%     Comments:  There is a nonspecific echogenic focus in the left ventricle of the fetal heart.     IMPRESSION:     1.  Single intrauterine pregnancy of an estimated gestational age of 19 weeks, 5 days with an estimated date of delivery of 3/4/2021.  2.  There is a nonspecific echogenic focus in the left ventricle of the fetal heart.  3.  Fetal survey is otherwise within normal limits.    A:  IUP 21w1d  Patient Active Problem List    Diagnosis Date Noted   • Encounter for supervision of high-risk pregnancy with elderly multigravida 09/22/2020        P:  1.  Reviewed labs, ultrasound w pt.          2.  Questions answered.          3.  Encouraged adequate water intake        4.  F/u 4 wks.        5.  Repeat PNP ordered, lab slip given to pt.         6.  Flu vaccine declined today.

## 2020-10-20 NOTE — PROGRESS NOTES
Pt. Here for OB/FU. Reports Good MICHELL.   Good # [phone]  Pt. Denies VB, LOF, or UC's.   Pharmacy verified.   Chaperone offered and not indicated  Pt states no complaints or concerns today  Pt would like to think about the Flu Vaccine please ask at her Next office visit

## 2020-10-26 ENCOUNTER — HOSPITAL ENCOUNTER (OUTPATIENT)
Dept: LAB | Facility: MEDICAL CENTER | Age: 40
End: 2020-10-26
Attending: NURSE PRACTITIONER

## 2020-10-26 DIAGNOSIS — O09.529 ENCOUNTER FOR SUPERVISION OF HIGH-RISK PREGNANCY WITH ELDERLY MULTIGRAVIDA: ICD-10-CM

## 2020-10-26 LAB
ABO GROUP BLD: NORMAL
BASOPHILS # BLD AUTO: 0.2 % (ref 0–1.8)
BASOPHILS # BLD: 0.02 K/UL (ref 0–0.12)
BLD GP AB SCN SERPL QL: NORMAL
EOSINOPHIL # BLD AUTO: 0.07 K/UL (ref 0–0.51)
EOSINOPHIL NFR BLD: 0.7 % (ref 0–6.9)
ERYTHROCYTE [DISTWIDTH] IN BLOOD BY AUTOMATED COUNT: 44 FL (ref 35.9–50)
HBV SURFACE AG SER QL: ABNORMAL
HCT VFR BLD AUTO: 37.7 % (ref 37–47)
HCV AB SER QL: ABNORMAL
HGB BLD-MCNC: 12.8 G/DL (ref 12–16)
HIV 1+2 AB+HIV1 P24 AG SERPL QL IA: NORMAL
IMM GRANULOCYTES # BLD AUTO: 0.09 K/UL (ref 0–0.11)
IMM GRANULOCYTES NFR BLD AUTO: 0.9 % (ref 0–0.9)
LYMPHOCYTES # BLD AUTO: 1.88 K/UL (ref 1–4.8)
LYMPHOCYTES NFR BLD: 19.1 % (ref 22–41)
MCH RBC QN AUTO: 32.2 PG (ref 27–33)
MCHC RBC AUTO-ENTMCNC: 34 G/DL (ref 33.6–35)
MCV RBC AUTO: 95 FL (ref 81.4–97.8)
MONOCYTES # BLD AUTO: 0.74 K/UL (ref 0–0.85)
MONOCYTES NFR BLD AUTO: 7.5 % (ref 0–13.4)
NEUTROPHILS # BLD AUTO: 7.05 K/UL (ref 2–7.15)
NEUTROPHILS NFR BLD: 71.6 % (ref 44–72)
NRBC # BLD AUTO: 0 K/UL
NRBC BLD-RTO: 0 /100 WBC
PLATELET # BLD AUTO: 244 K/UL (ref 164–446)
PMV BLD AUTO: 10.7 FL (ref 9–12.9)
RBC # BLD AUTO: 3.97 M/UL (ref 4.2–5.4)
RH BLD: NORMAL
RUBV AB SER QL: 73.8 IU/ML
TREPONEMA PALLIDUM IGG+IGM AB [PRESENCE] IN SERUM OR PLASMA BY IMMUNOASSAY: ABNORMAL
WBC # BLD AUTO: 9.9 K/UL (ref 4.8–10.8)

## 2020-10-26 PROCEDURE — 86900 BLOOD TYPING SEROLOGIC ABO: CPT

## 2020-10-26 PROCEDURE — 87340 HEPATITIS B SURFACE AG IA: CPT

## 2020-10-26 PROCEDURE — 36415 COLL VENOUS BLD VENIPUNCTURE: CPT

## 2020-10-26 PROCEDURE — 86850 RBC ANTIBODY SCREEN: CPT

## 2020-10-26 PROCEDURE — 86803 HEPATITIS C AB TEST: CPT

## 2020-10-26 PROCEDURE — 85025 COMPLETE CBC W/AUTO DIFF WBC: CPT

## 2020-10-26 PROCEDURE — 86780 TREPONEMA PALLIDUM: CPT

## 2020-10-26 PROCEDURE — 86762 RUBELLA ANTIBODY: CPT

## 2020-10-26 PROCEDURE — 87389 HIV-1 AG W/HIV-1&-2 AB AG IA: CPT

## 2020-10-26 PROCEDURE — 86901 BLOOD TYPING SEROLOGIC RH(D): CPT

## 2020-11-13 ENCOUNTER — ROUTINE PRENATAL (OUTPATIENT)
Dept: OBGYN | Facility: CLINIC | Age: 40
End: 2020-11-13

## 2020-11-13 VITALS — WEIGHT: 151 LBS | SYSTOLIC BLOOD PRESSURE: 100 MMHG | BODY MASS INDEX: 30.04 KG/M2 | DIASTOLIC BLOOD PRESSURE: 50 MMHG

## 2020-11-13 DIAGNOSIS — O09.529 ENCOUNTER FOR SUPERVISION OF HIGH-RISK PREGNANCY WITH ELDERLY MULTIGRAVIDA: Primary | ICD-10-CM

## 2020-11-13 PROCEDURE — 90471 IMMUNIZATION ADMIN: CPT | Performed by: NURSE PRACTITIONER

## 2020-11-13 PROCEDURE — 90686 IIV4 VACC NO PRSV 0.5 ML IM: CPT | Performed by: NURSE PRACTITIONER

## 2020-11-13 PROCEDURE — 90040 PR PRENATAL FOLLOW UP: CPT | Performed by: NURSE PRACTITIONER

## 2020-11-13 NOTE — PROGRESS NOTES
OB follow up   + fetal movement. Active  No VB, LOF or UC's.  Flu vaccine offered Today  Phone #  [phone]  Preferred pharmacy confirmed.  No complaints as of today     NDC: 91286-134-76  LOT#: [id number]  Expiration Date: 2021  Dose: 0.5ml  Site: Right Deltoid  Patient educated on use and side effects of medication. Name and  verified prior to injection. Pt tolerated? Well   Verified by Celia  Administered by Emely Singh, Med Ass't at 9:40 AM.  Patient Provided Medication: no

## 2020-11-13 NOTE — PROGRESS NOTES
S) Pt is a 40 y.o.   at 24w4d  gestation. Routine prenatal care today. No complaints today. 3rd trimester labs ordered and discussed. Flu shot today.  labor precautions reviewed, all questions answered.    Fetal movement Normal  Cramping no  VB no  LOF no   Denies dysuria. Generally feels well today. Good self-care activities identified. Denies headaches, swelling, visual changes, or epigastric pain .     O) /50   Wt 68.5 kg (151 lb)         Labs:       PNL: WNL       GCT: Ordered today        AFP: normal       GBS: N/A       Pertinent ultrasound -        10/13/20- Survey WNL, CHUCHO 15.01cm, c/w prev dating    A) IUP at 24w4d       S=D         Patient Active Problem List    Diagnosis Date Noted   • Encounter for supervision of high-risk pregnancy with elderly multigravida 2020          SVE: deferred       Chaperone offered: n/a         TDAP: no       FLU: yes        BTL: no       : n/a       C/S Consent: n/a       IOL or C/S scheduled: no       LAST PAP: 3/20/18- negative         P) s/s ptl vs general discomforts. Fetal movements reviewed. General ed and anticipatory guidance. Nutrition/exercise/vitamin. Plans breast Plans pp contraception- unsure  Continue PNV.

## 2020-11-23 ENCOUNTER — APPOINTMENT (OUTPATIENT)
Dept: LAB | Facility: MEDICAL CENTER | Age: 40
End: 2020-11-23

## 2020-11-25 ENCOUNTER — HOSPITAL ENCOUNTER (OUTPATIENT)
Dept: LAB | Facility: MEDICAL CENTER | Age: 40
End: 2020-11-25
Attending: NURSE PRACTITIONER
Payer: COMMERCIAL

## 2020-11-25 DIAGNOSIS — O09.529 ENCOUNTER FOR SUPERVISION OF HIGH-RISK PREGNANCY WITH ELDERLY MULTIGRAVIDA: ICD-10-CM

## 2020-11-25 LAB
ERYTHROCYTE [DISTWIDTH] IN BLOOD BY AUTOMATED COUNT: 42.7 FL (ref 35.9–50)
GLUCOSE 1H P 50 G GLC PO SERPL-MCNC: 149 MG/DL (ref 70–139)
HCT VFR BLD AUTO: 34.7 % (ref 37–47)
HGB BLD-MCNC: 11.8 G/DL (ref 12–16)
MCH RBC QN AUTO: 31.9 PG (ref 27–33)
MCHC RBC AUTO-ENTMCNC: 34 G/DL (ref 33.6–35)
MCV RBC AUTO: 93.8 FL (ref 81.4–97.8)
PLATELET # BLD AUTO: 244 K/UL (ref 164–446)
PMV BLD AUTO: 10.8 FL (ref 9–12.9)
RBC # BLD AUTO: 3.7 M/UL (ref 4.2–5.4)
TREPONEMA PALLIDUM IGG+IGM AB [PRESENCE] IN SERUM OR PLASMA BY IMMUNOASSAY: NORMAL
WBC # BLD AUTO: 10.7 K/UL (ref 4.8–10.8)

## 2020-11-30 ENCOUNTER — TELEPHONE (OUTPATIENT)
Dept: OBGYN | Facility: CLINIC | Age: 40
End: 2020-11-30

## 2020-11-30 DIAGNOSIS — Z3A.27 27 WEEKS GESTATION OF PREGNANCY: ICD-10-CM

## 2020-11-30 NOTE — TELEPHONE ENCOUNTER
----- Message from MERVAT Garcia sent at 11/30/2020  1:45 PM PST -----  Pt needs three hour. Ordered      11/30/2020 1539 called but no answer and unable to leave VM. VM not set up. Will try again later.   12/1/2020 1606 Pt notified of abnormal 1hr gtt and need to do 3hr gtt this time. Pt instructed to fast 10-12hrs prior to testing. Pt informed she is only allow to drink plain water during fasting time. Pt will call lab to schedule an appt. Advised to bring a snack for after the test is done. Pt notified will be staying in the labs for the 3hr. Pt agreed to do it Saturday 12/5/2020. Pt verbalized understanding.

## 2020-12-04 ENCOUNTER — HOSPITAL ENCOUNTER (OUTPATIENT)
Dept: LAB | Facility: MEDICAL CENTER | Age: 40
End: 2020-12-04
Attending: NURSE PRACTITIONER
Payer: COMMERCIAL

## 2020-12-04 DIAGNOSIS — Z3A.27 27 WEEKS GESTATION OF PREGNANCY: ICD-10-CM

## 2020-12-04 LAB
GLUCOSE 1H P CHAL SERPL-MCNC: 176 MG/DL (ref 65–180)
GLUCOSE 2H P CHAL SERPL-MCNC: 145 MG/DL (ref 65–155)
GLUCOSE 3H P CHAL SERPL-MCNC: 138 MG/DL (ref 65–140)
GLUCOSE BS SERPL-MCNC: 72 MG/DL (ref 65–95)

## 2020-12-11 ENCOUNTER — ROUTINE PRENATAL (OUTPATIENT)
Dept: OBGYN | Facility: CLINIC | Age: 40
End: 2020-12-11

## 2020-12-11 VITALS — DIASTOLIC BLOOD PRESSURE: 56 MMHG | SYSTOLIC BLOOD PRESSURE: 100 MMHG | WEIGHT: 151 LBS | BODY MASS INDEX: 30.04 KG/M2

## 2020-12-11 DIAGNOSIS — O09.529 ENCOUNTER FOR SUPERVISION OF HIGH-RISK PREGNANCY WITH ELDERLY MULTIGRAVIDA: Primary | ICD-10-CM

## 2020-12-11 PROCEDURE — 90471 IMMUNIZATION ADMIN: CPT | Performed by: NURSE PRACTITIONER

## 2020-12-11 PROCEDURE — 90715 TDAP VACCINE 7 YRS/> IM: CPT | Performed by: NURSE PRACTITIONER

## 2020-12-11 PROCEDURE — 90040 PR PRENATAL FOLLOW UP: CPT | Mod: 25 | Performed by: NURSE PRACTITIONER

## 2020-12-11 NOTE — LETTER
"Count Your Baby's Movements  Another step to a healthy delivery    A Epic Dress Re Test             Dept: [phone]    How Many Weeks Pregnant? Unknown    Date to Begin Counting: ***              How to use this chart    One way for your physician to keep track of your baby's health is by knowing how often the baby moves (or \"kicks\") in your womb.  You can help your physician to do this by using this chart every day.    Every day, you should see how many hours it takes for your baby to move 10 times.  Start in the morning, as soon as you get up.    · First, write down the time your baby moves until you get to 10.  · Check off one box every time your baby moves until you get to 10.  · Write down the time you finished counting in the last column.  · Total how long it took to count up all 10 movements.  · Finally, fill in the box that shows how long this took.  After counting 10 movements, you no longer have to count any more that day.  The next morning, just start counting again as soon as you get up.    What should you call a \"movement\"?  It is hard to say, because it will feel different from one mother to another and from one pregnancy to the next.  The important thing is that you count the movements the same way throughout your pregnancy.  If you have more questions, you should ask your physician.    Count carefully every day!  SAMPLE:  Week 28    How many hours did it take to feel 10 movements?       Start  Time     1     2     3     4     5     6     7     8     9     10   Finish Time   Mon 8:20 ·  ·  ·  ·  ·  ·  ·  ·  ·  ·  11:40   Tue Wed Thu Fri               Sat               Sun                 IMPORTANT: You should contact your physician if it takes more than two hours for you to feel 10 movements.  Each morning, write down the time and start to count the movements of your baby.  Keep track by checking off one box every time you feel one movement.  When you have " "felt 10 \"kicks\", write down the time you finished counting in the last column.  Then fill in the   box (over the check ermias) for the number of hours it took.  Be sure to read the complete instructions on the previous page.            "

## 2020-12-11 NOTE — LETTER
Cuente los Movimientos de hoffman Bebé  Otro paso importante para la chava de hoffman bebé    Gloriashannon Carrion     [facility]            Dept: [phone]    ¿Cuántas semanas tiene de embarazo? 28w4d    Fecha cuando tiene que comenzar a contar el movimiento: 12/11/2020                  Madai debe usar geoff diagrama    Ellen manera en que hoffman doctor puede controlar a chava de hoffman bebé es sabiendo cuantas veces se mueve hoffman bebé en el útero, o por medio de las “pataditas”.  Usted podrá ayudarle a hoffman médico al usar cada día el siguiente diagrama.    Cada día, usted debe prestar atención a cuantas horas le lleva a hoffman bebé moverse 10 veces.  Comience a contar en la mañana, lo antes posible después de haberse levantado.    · Primeramente, escriba la hora en que se mueve hoffman bebé, hasta llegar a 10 veces.  · Colóquele un check o palomita a cada cuadrito cada vez que hoffman bebé se mueva hasta que complete 10 veces.  · Escriba la hora cuando termine de contar 10 veces en la última columna.  · Sume el total del tiempo que le llevó contar los 10 movimientos.  · Finalmente, complete el cuadrito de cuantas horas le llevó hacerlo.    Después de maryse contado los 10 movimientos, ya no tendrá que contar los demás movimientos por el amanda del día.  A la mañana siguiente, comience a contar de nuevo cuantas veces se mueve el bebé desde el momento en que se levante.    ¿Qué tendría que considerarse un “movimiento”?  Es difícil de decirlo porque es distinto de ellen madre a otra, y de un embarazo a otro.  Lo importante es que cuente el movimiento de la misma manera dustin el transcurso de hoffman embarazo.  Si tiene preguntas adicionales, pregúntele a hoffman doctor.    ¡Cuente cuidadosamente cada día!     MUESTRA:  Semana 28    ¿Cuántas horas le ha llevado sentir 10 movimientos?        Hora de Inicio     1     2     3     4     5     6     7     8     9     10   Hora de Finlizar   Corrine. 8:20 ·  ·  ·  ·  ·  ·  ·  ·  ·  ·  11:40      Mar.               Mié.               Jue.               Vie.               Sáb.               Dom.                 IMPORTANTE:  Usted debe contactar a hoffman doctor si le lleva más de 2 horas sentir 10 movimientos de hoffman bebé.    Cada mañana, escriba la hora de inicio y comience a contar los movimientos de hoffman bebé.  Hágalo colocándole un check o palomita a cada cuadrito cada vez que sienta un movimiento de hoffman bebé.  Cuando haya sentido 10 “pataditas”, escriba la hora en que terminó de contar en la última columna.  Luego, complete en la cajita (arriba de la elliott de check o palomita) el número total de horas que le llevó hacerlo.  Asegúrese de leer completamente las instrucciones en la página anterior.

## 2020-12-11 NOTE — PROGRESS NOTES
Pt here today for OB follow up  Pt states no complaints   Reports +FM   Good # [phone]   Pharmacy Confirmed.  Chaperone offered and not indicated.   3 hr GTT, WNL   Pt given WILLIE sheet and instructions  Pt would like to think about BTL, ask at next visit.   Pt would like TDAP, consent signed   Tdap vaccine given today. RIGHT Deltoid. VIS given and screening check list reviewed with pt.  Tdap vaccine verified by AM

## 2020-12-11 NOTE — PROGRESS NOTES
S) Pt is a 40 y.o.   at 28w4d  gestation. Routine prenatal care today. No complaints today. Tdap today. Reviewed recent lab results. Discussed BTL, will sign forms, but also talk to partner.  labor precautions reviewed, all questions answered.    Fetal movement Normal  Cramping no  VB no  LOF no   Denies dysuria. Generally feels well today. Good self-care activities identified. Denies headaches, swelling, visual changes, or epigastric pain .     O) /56   Wt 68.5 kg (151 lb)         Labs:       PNL: WNL       GCT: 149, but 3 hour WNL        AFP: normal       GBS: N/A       Pertinent ultrasound -        10/13/20- Survey WNL, CHUCHO 15.01cm, c/w prev dating.    A) IUP at 28w4d       S=D         Patient Active Problem List    Diagnosis Date Noted   • Encounter for supervision of high-risk pregnancy with elderly multigravida 2020          SVE: deferred       Chaperone offered: n/a         TDAP: yes       FLU: yes        BTL: yes       : n/a       C/S Consent: n/a       IOL or C/S scheduled: no       LAST PAP: 3/20/18- negative         P) s/s ptl vs general discomforts. Fetal movements reviewed. General ed and anticipatory guidance. Nutrition/exercise/vitamin. Plans breast Plans pp contraception- BTL if c/s.  Continue PNV.

## 2020-12-24 ENCOUNTER — ROUTINE PRENATAL (OUTPATIENT)
Dept: OBGYN | Facility: CLINIC | Age: 40
End: 2020-12-24

## 2020-12-24 VITALS — WEIGHT: 154.4 LBS | BODY MASS INDEX: 30.72 KG/M2 | DIASTOLIC BLOOD PRESSURE: 60 MMHG | SYSTOLIC BLOOD PRESSURE: 108 MMHG

## 2020-12-24 DIAGNOSIS — Z34.83 ENCOUNTER FOR SUPERVISION OF OTHER NORMAL PREGNANCY IN THIRD TRIMESTER: Primary | ICD-10-CM

## 2020-12-24 PROCEDURE — 90040 PR PRENATAL FOLLOW UP: CPT | Performed by: NURSE PRACTITIONER

## 2020-12-24 NOTE — PROGRESS NOTES
Pt. Here for OB/FU. Reports Good FM.   Good # [phone]  Pt. Denies VB, LOF, or UC's.   Pharmacy verified.   Chaperone offered and not indicated  Pt states no complaints or concerns today.  BTL declined

## 2020-12-24 NOTE — PROGRESS NOTES
S:  Pt is  at 30w3d for routine OB follow up.  No concerns today. No ED or hospital visits since last seen. Reports good FM.  Denies VB, LOF, RUCs or vaginal DC.    O:  Please see above vitals.        FHTs: 135        Fundal ht: 30 cm.        S=D        3hr GTT: wnl -- reviewed w pt.    A:  IUP at 30w3d  Patient Active Problem List    Diagnosis Date Noted   • Encounter for supervision of high-risk pregnancy with elderly multigravida 2020        P:  1.  PP contraception: delcines BTL, unsure of BCM.        2.  Continue FKCs.          3.  Questions answered.          4.  Encouraged pt to tour L&D.          5.  Encourage adequate water intake.        6.  F/u 2 wks.

## 2021-01-07 ENCOUNTER — ROUTINE PRENATAL (OUTPATIENT)
Dept: OBGYN | Facility: CLINIC | Age: 41
End: 2021-01-07

## 2021-01-07 VITALS — SYSTOLIC BLOOD PRESSURE: 116 MMHG | BODY MASS INDEX: 30.64 KG/M2 | DIASTOLIC BLOOD PRESSURE: 70 MMHG | WEIGHT: 154 LBS

## 2021-01-07 DIAGNOSIS — O09.523 MULTIGRAVIDA OF ADVANCED MATERNAL AGE IN THIRD TRIMESTER: ICD-10-CM

## 2021-01-07 PROBLEM — O09.529 ADVANCED MATERNAL AGE IN MULTIGRAVIDA: Status: ACTIVE | Noted: 2021-01-07

## 2021-01-07 PROCEDURE — 90040 PR PRENATAL FOLLOW UP: CPT | Performed by: NURSE PRACTITIONER

## 2021-01-07 NOTE — PROGRESS NOTES
SUBJECTIVE:  Pt is a 40 y.o.   at 32w3d  gestation. Presents today for follow-up prenatal care. Reports no issues at this time.  Reports good fetal movement. Denies regular cramping/contractions, bleeding or leaking of fluid. Denies dysuria, headaches, N/V. Generally feels well today except cramping pains randomly is different muscles of body but is only drinking two bottles of water a day.     OBJECTIVE:  - See prenatal vitals flow  -   Vitals:    21 0929   BP: 116/70   Weight: 69.9 kg (154 lb)                 ASSESSMENT:   - IUP at 32w3d    - S<D   -   Patient Active Problem List    Diagnosis Date Noted   • Advanced maternal age in multigravida 2021         PLAN:  - S/sx pregnancy and labor warning signs vs general discomforts discussed  - Fetal movements and/or kick counts reviewed   - Adequate hydration reinforced  - Nutrition/exercise/vitamin education; continue PNV  - US for S<D  - Plans unsure for contraception Pp: handout given and reviewed  - S/p Tdap vacc   - S/p Flu vacc   - Reviewed NST at 36 weeks and IOL at 39 weeks if pt desires for AMA  - Anticipatory guidance given  - RTC in 2 weeks for follow-up prenatal care

## 2021-01-07 NOTE — PROGRESS NOTES
OB follow up   + fetal movement.  No VB, LOF or UC's.  Flu vaccine and Tdap current  [phone] (home)   Preferred pharmacy confirmed.   #[phone]

## 2021-01-21 ENCOUNTER — APPOINTMENT (OUTPATIENT)
Dept: RADIOLOGY | Facility: IMAGING CENTER | Age: 41
End: 2021-01-21
Attending: NURSE PRACTITIONER

## 2021-01-21 ENCOUNTER — ROUTINE PRENATAL (OUTPATIENT)
Dept: OBGYN | Facility: CLINIC | Age: 41
End: 2021-01-21

## 2021-01-21 VITALS — SYSTOLIC BLOOD PRESSURE: 118 MMHG | DIASTOLIC BLOOD PRESSURE: 60 MMHG | BODY MASS INDEX: 31.39 KG/M2 | WEIGHT: 157.8 LBS

## 2021-01-21 DIAGNOSIS — O09.523 MULTIGRAVIDA OF ADVANCED MATERNAL AGE IN THIRD TRIMESTER: ICD-10-CM

## 2021-01-21 PROCEDURE — 76816 OB US FOLLOW-UP PER FETUS: CPT | Mod: TC | Performed by: NURSE PRACTITIONER

## 2021-01-21 PROCEDURE — 90040 PR PRENATAL FOLLOW UP: CPT | Performed by: PHYSICIAN ASSISTANT

## 2021-01-21 NOTE — PROGRESS NOTES
Pt. Here for OB/FU. Reports Good FM.   Good # [phone]   Pt. Denies VB, LOF, or UC's.   Pharmacy verified.   Chaperone offered and denied

## 2021-01-21 NOTE — PROGRESS NOTES
Pt has no complaints with cramping, UCs, Vb, LOF, though pt has had occ tightening of abd only. +FM. US done today for growth - no report yet, so pt informed we will follow up. GBS next visit. FUndal heigh appropriate today. RTC 2 wk or sooner prn. PTL precautions reviewed.

## 2021-02-04 ENCOUNTER — HOSPITAL ENCOUNTER (OUTPATIENT)
Facility: MEDICAL CENTER | Age: 41
End: 2021-02-04
Attending: NURSE PRACTITIONER
Payer: COMMERCIAL

## 2021-02-04 ENCOUNTER — ROUTINE PRENATAL (OUTPATIENT)
Dept: OBGYN | Facility: CLINIC | Age: 41
End: 2021-02-04

## 2021-02-04 VITALS — WEIGHT: 162 LBS | BODY MASS INDEX: 32.23 KG/M2 | SYSTOLIC BLOOD PRESSURE: 126 MMHG | DIASTOLIC BLOOD PRESSURE: 68 MMHG

## 2021-02-04 DIAGNOSIS — O09.523 MULTIGRAVIDA OF ADVANCED MATERNAL AGE IN THIRD TRIMESTER: ICD-10-CM

## 2021-02-04 LAB
NST ACOUSTIC STIMULATION: NORMAL
NST ACTION NECESSARY: NORMAL
NST ASSESSMENT: NORMAL
NST BASELINE: 120
NST INDICATIONS: NORMAL
NST OTHER DATA: NORMAL
NST READ BY: NORMAL
NST RETURN: NORMAL
NST UTERINE ACTIVITY: NORMAL

## 2021-02-04 PROCEDURE — 59025 FETAL NON-STRESS TEST: CPT | Performed by: NURSE PRACTITIONER

## 2021-02-04 PROCEDURE — 90040 PR PRENATAL FOLLOW UP: CPT | Performed by: NURSE PRACTITIONER

## 2021-02-04 ASSESSMENT — EDINBURGH POSTNATAL DEPRESSION SCALE (EPDS)
I HAVE LOOKED FORWARD WITH ENJOYMENT TO THINGS: RATHER LESS THAN I USED TO
I HAVE BEEN ANXIOUS OR WORRIED FOR NO GOOD REASON: NO, NOT AT ALL
I HAVE BEEN ABLE TO LAUGH AND SEE THE FUNNY SIDE OF THINGS: AS MUCH AS I ALWAYS COULD
I HAVE FELT SAD OR MISERABLE: NO, NOT AT ALL
THE THOUGHT OF HARMING MYSELF HAS OCCURRED TO ME: NEVER
I HAVE FELT SCARED OR PANICKY FOR NO GOOD REASON: NO, NOT MUCH
I HAVE BLAMED MYSELF UNNECESSARILY WHEN THINGS WENT WRONG: NO, NEVER
I HAVE BEEN SO UNHAPPY THAT I HAVE BEEN CRYING: NO, NEVER
TOTAL SCORE: 2
I HAVE BEEN SO UNHAPPY THAT I HAVE HAD DIFFICULTY SLEEPING: NOT AT ALL
THINGS HAVE BEEN GETTING ON TOP OF ME: NO, I HAVE BEEN COPING AS WELL AS EVER

## 2021-02-04 NOTE — PROGRESS NOTES
SUBJECTIVE:  Pt is a 40 y.o.   at 36w3d  gestation. Presents today for follow-up prenatal care. Reports no issues at this time.  Reports good  fetal movement. Denies regular cramping/contractions, bleeding or leaking of fluid. Denies dysuria, headaches, N/V. Generally feels well today except itchy rash on abdomen that has been there two weeks but is only on her upper abdomen. Has tried a cream and that did not work and has also put wine and salt and that gave her some relief.     OBJECTIVE:  - See prenatal vitals flow  -   Vitals:    21 1005   BP: 126/68   Weight: 73.5 kg (162 lb)                 ASSESSMENT:   - IUP at 36w3d    - S=D   -   Patient Active Problem List    Diagnosis Date Noted   • Advanced maternal age in multigravida 2021         PLAN:  - S/sx pregnancy and labor warning signs vs general discomforts discussed  - Fetal movements and/or kick counts reviewed   - Adequate hydration reinforced  - Nutrition/exercise/vitamin education; continue PNV  - S/p Tdap vacc   - S/p Flu vacc   - Will change products to free and clear/sensitive and try benadryl and will let us know if not improving   - Weekly NSTs for AMA  - Reviewed US with Shiva and she reports no concern for IUGR at this point and due to late gestation would be difficult to get in with MFM for a follow up US  - GBS collected along with EPDS screening   - Anticipatory guidance given  - Declines 39 week IOL at this time  - RTC in 1 weeks for follow-up prenatal care

## 2021-02-04 NOTE — PROGRESS NOTES
OB follow up   + fetal movement.  No VB, LOF or UC's.  Flu and Tdap vaccine up to date   Phone # [phone]  Preferred pharmacy confirmed.  Pt c/o rash on abd x2 weeks  EPDS 2

## 2021-02-05 ENCOUNTER — HOSPITAL ENCOUNTER (INPATIENT)
Facility: MEDICAL CENTER | Age: 41
LOS: 2 days | End: 2021-02-07
Attending: OBSTETRICS & GYNECOLOGY | Admitting: OBSTETRICS & GYNECOLOGY

## 2021-02-05 LAB
BASOPHILS # BLD AUTO: 0.2 % (ref 0–1.8)
BASOPHILS # BLD: 0.03 K/UL (ref 0–0.12)
EOSINOPHIL # BLD AUTO: 0.09 K/UL (ref 0–0.51)
EOSINOPHIL NFR BLD: 0.6 % (ref 0–6.9)
ERYTHROCYTE [DISTWIDTH] IN BLOOD BY AUTOMATED COUNT: 44.5 FL (ref 35.9–50)
HCT VFR BLD AUTO: 40.9 % (ref 37–47)
HGB BLD-MCNC: 13.9 G/DL (ref 12–16)
HOLDING TUBE BB 8507: NORMAL
IMM GRANULOCYTES # BLD AUTO: 0.18 K/UL (ref 0–0.11)
IMM GRANULOCYTES NFR BLD AUTO: 1.2 % (ref 0–0.9)
LYMPHOCYTES # BLD AUTO: 3.45 K/UL (ref 1–4.8)
LYMPHOCYTES NFR BLD: 23.5 % (ref 22–41)
MCH RBC QN AUTO: 31.4 PG (ref 27–33)
MCHC RBC AUTO-ENTMCNC: 34 G/DL (ref 33.6–35)
MCV RBC AUTO: 92.5 FL (ref 81.4–97.8)
MONOCYTES # BLD AUTO: 1.23 K/UL (ref 0–0.85)
MONOCYTES NFR BLD AUTO: 8.4 % (ref 0–13.4)
NEUTROPHILS # BLD AUTO: 9.67 K/UL (ref 2–7.15)
NEUTROPHILS NFR BLD: 66.1 % (ref 44–72)
NRBC # BLD AUTO: 0 K/UL
NRBC BLD-RTO: 0 /100 WBC
PLATELET # BLD AUTO: 277 K/UL (ref 164–446)
PMV BLD AUTO: 11.4 FL (ref 9–12.9)
RBC # BLD AUTO: 4.42 M/UL (ref 4.2–5.4)
SARS-COV+SARS-COV-2 AG RESP QL IA.RAPID: NOTDETECTED
SPECIMEN SOURCE: NORMAL
WBC # BLD AUTO: 14.7 K/UL (ref 4.8–10.8)

## 2021-02-05 PROCEDURE — 304965 HCHG RECOVERY SERVICES

## 2021-02-05 PROCEDURE — 59409 OBSTETRICAL CARE: CPT

## 2021-02-05 PROCEDURE — 302449 STATCHG TRIAGE ONLY (STATISTIC)

## 2021-02-05 PROCEDURE — 85025 COMPLETE CBC W/AUTO DIFF WBC: CPT

## 2021-02-05 PROCEDURE — A9270 NON-COVERED ITEM OR SERVICE: HCPCS | Performed by: NURSE PRACTITIONER

## 2021-02-05 PROCEDURE — 87426 SARSCOV CORONAVIRUS AG IA: CPT

## 2021-02-05 PROCEDURE — 700111 HCHG RX REV CODE 636 W/ 250 OVERRIDE (IP): Performed by: NURSE PRACTITIONER

## 2021-02-05 PROCEDURE — 59409 OBSTETRICAL CARE: CPT | Performed by: NURSE PRACTITIONER

## 2021-02-05 PROCEDURE — 36415 COLL VENOUS BLD VENIPUNCTURE: CPT

## 2021-02-05 PROCEDURE — 700102 HCHG RX REV CODE 250 W/ 637 OVERRIDE(OP): Performed by: NURSE PRACTITIONER

## 2021-02-05 PROCEDURE — U0003 INFECTIOUS AGENT DETECTION BY NUCLEIC ACID (DNA OR RNA); SEVERE ACUTE RESPIRATORY SYNDROME CORONAVIRUS 2 (SARS-COV-2) (CORONAVIRUS DISEASE [COVID-19]), AMPLIFIED PROBE TECHNIQUE, MAKING USE OF HIGH THROUGHPUT TECHNOLOGIES AS DESCRIBED BY CMS-2020-01-R: HCPCS

## 2021-02-05 PROCEDURE — U0005 INFEC AGEN DETEC AMPLI PROBE: HCPCS

## 2021-02-05 PROCEDURE — 770002 HCHG ROOM/CARE - OB PRIVATE (112)

## 2021-02-05 RX ORDER — MISOPROSTOL 200 UG/1
800 TABLET ORAL
Status: DISCONTINUED | OUTPATIENT
Start: 2021-02-05 | End: 2021-02-05 | Stop reason: HOSPADM

## 2021-02-05 RX ORDER — IBUPROFEN 800 MG/1
800 TABLET ORAL EVERY 8 HOURS PRN
Status: DISCONTINUED | OUTPATIENT
Start: 2021-02-05 | End: 2021-02-07 | Stop reason: HOSPADM

## 2021-02-05 RX ORDER — SODIUM CHLORIDE, SODIUM LACTATE, POTASSIUM CHLORIDE, CALCIUM CHLORIDE 600; 310; 30; 20 MG/100ML; MG/100ML; MG/100ML; MG/100ML
INJECTION, SOLUTION INTRAVENOUS PRN
Status: DISCONTINUED | OUTPATIENT
Start: 2021-02-05 | End: 2021-02-07 | Stop reason: HOSPADM

## 2021-02-05 RX ORDER — METHYLERGONOVINE MALEATE 0.2 MG/ML
0.2 INJECTION INTRAVENOUS
Status: DISCONTINUED | OUTPATIENT
Start: 2021-02-05 | End: 2021-02-07 | Stop reason: HOSPADM

## 2021-02-05 RX ORDER — SODIUM CHLORIDE, SODIUM LACTATE, POTASSIUM CHLORIDE, CALCIUM CHLORIDE 600; 310; 30; 20 MG/100ML; MG/100ML; MG/100ML; MG/100ML
INJECTION, SOLUTION INTRAVENOUS CONTINUOUS
Status: ACTIVE | OUTPATIENT
Start: 2021-02-05 | End: 2021-02-06

## 2021-02-05 RX ORDER — OXYTOCIN 10 [USP'U]/ML
10 INJECTION, SOLUTION INTRAMUSCULAR; INTRAVENOUS
Status: DISCONTINUED | OUTPATIENT
Start: 2021-02-05 | End: 2021-02-05 | Stop reason: HOSPADM

## 2021-02-05 RX ORDER — METHYLERGONOVINE MALEATE 0.2 MG/ML
0.2 INJECTION INTRAVENOUS
Status: DISCONTINUED | OUTPATIENT
Start: 2021-02-05 | End: 2021-02-05 | Stop reason: HOSPADM

## 2021-02-05 RX ORDER — CARBOPROST TROMETHAMINE 250 UG/ML
250 INJECTION, SOLUTION INTRAMUSCULAR
Status: DISCONTINUED | OUTPATIENT
Start: 2021-02-05 | End: 2021-02-05 | Stop reason: HOSPADM

## 2021-02-05 RX ORDER — DOCUSATE SODIUM 100 MG/1
100 CAPSULE, LIQUID FILLED ORAL 2 TIMES DAILY PRN
Status: DISCONTINUED | OUTPATIENT
Start: 2021-02-05 | End: 2021-02-07 | Stop reason: HOSPADM

## 2021-02-05 RX ORDER — ACETAMINOPHEN 325 MG/1
650 TABLET ORAL EVERY 4 HOURS PRN
Status: DISCONTINUED | OUTPATIENT
Start: 2021-02-05 | End: 2021-02-07 | Stop reason: HOSPADM

## 2021-02-05 RX ORDER — MISOPROSTOL 200 UG/1
600 TABLET ORAL
Status: DISCONTINUED | OUTPATIENT
Start: 2021-02-05 | End: 2021-02-07 | Stop reason: HOSPADM

## 2021-02-05 RX ORDER — CARBOPROST TROMETHAMINE 250 UG/ML
250 INJECTION, SOLUTION INTRAMUSCULAR
Status: DISCONTINUED | OUTPATIENT
Start: 2021-02-05 | End: 2021-02-07 | Stop reason: HOSPADM

## 2021-02-05 RX ORDER — VITAMIN A ACETATE, BETA CAROTENE, ASCORBIC ACID, CHOLECALCIFEROL, .ALPHA.-TOCOPHEROL ACETATE, DL-, THIAMINE MONONITRATE, RIBOFLAVIN, NIACINAMIDE, PYRIDOXINE HYDROCHLORIDE, FOLIC ACID, CYANOCOBALAMIN, CALCIUM CARBONATE, FERROUS FUMARATE, ZINC OXIDE, CUPRIC OXIDE 3080; 12; 120; 400; 1; 1.84; 3; 20; 22; 920; 25; 200; 27; 10; 2 [IU]/1; UG/1; MG/1; [IU]/1; MG/1; MG/1; MG/1; MG/1; MG/1; [IU]/1; MG/1; MG/1; MG/1; MG/1; MG/1
1 TABLET, FILM COATED ORAL
Status: DISCONTINUED | OUTPATIENT
Start: 2021-02-06 | End: 2021-02-07 | Stop reason: HOSPADM

## 2021-02-05 RX ADMIN — OXYTOCIN 125 ML/HR: 10 INJECTION, SOLUTION INTRAMUSCULAR; INTRAVENOUS at 22:08

## 2021-02-05 RX ADMIN — IBUPROFEN 800 MG: 800 TABLET, FILM COATED ORAL at 22:06

## 2021-02-05 RX ADMIN — OXYTOCIN 2000 ML/HR: 10 INJECTION, SOLUTION INTRAMUSCULAR; INTRAVENOUS at 21:15

## 2021-02-05 ASSESSMENT — LIFESTYLE VARIABLES
EVER_SMOKED: NEVER
ALCOHOL_USE: NO

## 2021-02-06 LAB
ERYTHROCYTE [DISTWIDTH] IN BLOOD BY AUTOMATED COUNT: 43.2 FL (ref 35.9–50)
ERYTHROCYTE [DISTWIDTH] IN BLOOD BY AUTOMATED COUNT: 44.9 FL (ref 35.9–50)
HCT VFR BLD AUTO: 34.3 % (ref 37–47)
HCT VFR BLD AUTO: 35.4 % (ref 37–47)
HGB BLD-MCNC: 12 G/DL (ref 12–16)
HGB BLD-MCNC: 12 G/DL (ref 12–16)
MCH RBC QN AUTO: 31.7 PG (ref 27–33)
MCH RBC QN AUTO: 32.1 PG (ref 27–33)
MCHC RBC AUTO-ENTMCNC: 33.9 G/DL (ref 33.6–35)
MCHC RBC AUTO-ENTMCNC: 35 G/DL (ref 33.6–35)
MCV RBC AUTO: 91.7 FL (ref 81.4–97.8)
MCV RBC AUTO: 93.4 FL (ref 81.4–97.8)
PLATELET # BLD AUTO: 215 K/UL (ref 164–446)
PLATELET # BLD AUTO: 232 K/UL (ref 164–446)
PMV BLD AUTO: 10.7 FL (ref 9–12.9)
PMV BLD AUTO: 11.2 FL (ref 9–12.9)
RBC # BLD AUTO: 3.74 M/UL (ref 4.2–5.4)
RBC # BLD AUTO: 3.79 M/UL (ref 4.2–5.4)
SARS-COV-2 RNA RESP QL NAA+PROBE: NOTDETECTED
SPECIMEN SOURCE: NORMAL
WBC # BLD AUTO: 14.7 K/UL (ref 4.8–10.8)
WBC # BLD AUTO: 18.2 K/UL (ref 4.8–10.8)

## 2021-02-06 PROCEDURE — 36415 COLL VENOUS BLD VENIPUNCTURE: CPT

## 2021-02-06 PROCEDURE — 770002 HCHG ROOM/CARE - OB PRIVATE (112)

## 2021-02-06 PROCEDURE — A9270 NON-COVERED ITEM OR SERVICE: HCPCS | Performed by: NURSE PRACTITIONER

## 2021-02-06 PROCEDURE — 700102 HCHG RX REV CODE 250 W/ 637 OVERRIDE(OP): Performed by: NURSE PRACTITIONER

## 2021-02-06 PROCEDURE — 85027 COMPLETE CBC AUTOMATED: CPT

## 2021-02-06 RX ADMIN — IBUPROFEN 800 MG: 800 TABLET, FILM COATED ORAL at 22:27

## 2021-02-06 RX ADMIN — IBUPROFEN 800 MG: 800 TABLET, FILM COATED ORAL at 05:32

## 2021-02-06 RX ADMIN — IBUPROFEN 800 MG: 800 TABLET, FILM COATED ORAL at 12:27

## 2021-02-06 RX ADMIN — ACETAMINOPHEN 650 MG: 325 TABLET ORAL at 09:09

## 2021-02-06 RX ADMIN — PRENATAL WITH FERROUS FUM AND FOLIC ACID 1 TABLET: 3080; 920; 120; 400; 22; 1.84; 3; 20; 10; 1; 12; 200; 27; 25; 2 TABLET ORAL at 09:05

## 2021-02-06 ASSESSMENT — PAIN DESCRIPTION - PAIN TYPE
TYPE: ACUTE PAIN

## 2021-02-06 ASSESSMENT — EDINBURGH POSTNATAL DEPRESSION SCALE (EPDS)
THE THOUGHT OF HARMING MYSELF HAS OCCURRED TO ME: NEVER
I HAVE FELT SAD OR MISERABLE: NO, NOT AT ALL
I HAVE BEEN SO UNHAPPY THAT I HAVE BEEN CRYING: NO, NEVER
I HAVE FELT SCARED OR PANICKY FOR NO GOOD REASON: NO, NOT AT ALL
I HAVE BEEN ABLE TO LAUGH AND SEE THE FUNNY SIDE OF THINGS: AS MUCH AS I ALWAYS COULD
I HAVE BEEN ANXIOUS OR WORRIED FOR NO GOOD REASON: NO, NOT AT ALL
I HAVE LOOKED FORWARD WITH ENJOYMENT TO THINGS: AS MUCH AS I EVER DID
THINGS HAVE BEEN GETTING ON TOP OF ME: NO, I HAVE BEEN COPING AS WELL AS EVER
I HAVE BEEN SO UNHAPPY THAT I HAVE HAD DIFFICULTY SLEEPING: NOT AT ALL
I HAVE BLAMED MYSELF UNNECESSARILY WHEN THINGS WENT WRONG: NO, NEVER

## 2021-02-06 NOTE — PROGRESS NOTES
2140 Report from KRISHNA Morse, assumed care. Fundus firm, bleeding moderate-light, Pitocin placed onto pressure bag. Admission profile completed using  iPad. Answered questions, discussed POC.  2200 Bleeding light.   2245 Pt up to bathroom with steady gait. Voided, angelita care done. New pads and underwear placed.   2320 Pt transferred to postpartum unit in stable condition. Report given to KRISHNA Rogel. Bands and Cuddles verified.

## 2021-02-06 NOTE — PROGRESS NOTES
Patient educated on plan of care, including, safety, mobility, pain management, and medication administration. Patient asked to call for help with next breastfeed. Patient requesting to call for pain medication when needed. Patient has no needs or concerns at this time. Call light within reach.

## 2021-02-06 NOTE — H&P
History and Physical      Gloria Carrion is a 40 y.o. year old female  at 36w4d who presents for precipitous delivery in hospital elevator.     Pregnancy complicated by AMA and unknown GBS status.    Subjective:   positive  For CTXS.   positive Feels pain   positive for LOF  positive for vaginal bleeding.   positive for fetal movement    ROS: Pertinent items are noted in HPI.    No past medical history on file.  No past surgical history on file.  OB History    Para Term  AB Living   5 4 4     4   SAB TAB Ectopic Molar Multiple Live Births             4      # Outcome Date GA Lbr Bishnu/2nd Weight Sex Delivery Anes PTL Lv   5 Current            4 Term 18 39w2d   F Vag-Spont   ROSALIA   3 Term 10/07/06 40w0d  3.1 kg (6 lb 13.4 oz) F Vag-Spont   ROSALIA   2 Term 03 40w0d  2.4 kg (5 lb 4.7 oz) M Vag-Spont   ROSALIA   1 Term 02 40w0d  2.8 kg (6 lb 2.8 oz) M Vag-Spont   ROSALIA     Social History     Socioeconomic History   • Marital status:      Spouse name: Not on file   • Number of children: Not on file   • Years of education: Not on file   • Highest education level: Not on file   Occupational History   • Not on file   Social Needs   • Financial resource strain: Not on file   • Food insecurity     Worry: Not on file     Inability: Not on file   • Transportation needs     Medical: Not on file     Non-medical: Not on file   Tobacco Use   • Smoking status: Never Smoker   • Smokeless tobacco: Never Used   Substance and Sexual Activity   • Alcohol use: No     Comment: occ before pregnancy    • Drug use: No   • Sexual activity: Yes     Partners: Male     Birth control/protection: Condom     Comment: None    Lifestyle   • Physical activity     Days per week: Not on file     Minutes per session: Not on file   • Stress: Not on file   Relationships   • Social connections     Talks on phone: Not on file     Gets together: Not on file     Attends Druze service: Not on file     Active member  "of club or organization: Not on file     Attends meetings of clubs or organizations: Not on file     Relationship status: Not on file   • Intimate partner violence     Fear of current or ex partner: Not on file     Emotionally abused: Not on file     Physically abused: Not on file     Forced sexual activity: Not on file   Other Topics Concern   • Not on file   Social History Narrative   • Not on file     Allergies: Patient has no known allergies.    Current Facility-Administered Medications:   •  oxytocin (PITOCIN) 20 UNITS/1000ML LR, , , ,   •  LR infusion, , Intravenous, Continuous, Kelsi Grant, A.P.R.N.  •  oxytocin (PITOCIN) injection 10 Units, 10 Units, Intramuscular, Once PRN, Kelsi Grant, A.P.R.N.  •  miSOPROStol (CYTOTEC) tablet 800 mcg, 800 mcg, Rectal, Once PRN, Kelsi Grant, A.P.R.N.  •  methylergonovine (METHERGINE) injection 0.2 mg, 0.2 mg, Intramuscular, Once PRN, Kelsi Grant, A.P.R.N.  •  carboPROST (HEMABATE) injection 250 mcg, 250 mcg, Intramuscular, Once PRN, Kelsi Grant, A.P.R.N.    Prenatal care with [facility] starting at 17w1d, 8 total visits with following problems:  Patient Active Problem List    Diagnosis Date Noted   • Advanced maternal age in multigravida 01/07/2021         Objective:      /65   Pulse 66   Ht 1.499 m (4' 11\")   Wt 73.5 kg (162 lb)     General:   no acute distress   Skin:   normal   HEENT:  PERRLA   Lungs:   CTA bilateral   Heart:   S1, S2 normal, no murmur, click, rub or gallop, regular rate and rhythm, brisk carotid upstroke without bruits, peripheral pulses very brisk, chest is clear without rales or wheezing, no pedal edema, no JVD, no hepatosplenomegaly   Abdomen:   gravid, NT   EFW:  3100 -3200g   Pelvis:  Exam deferred., proven to 3100 g   FHTs: Not assessed   Contractions: Not assessed   Uterine Size: Not assessed   Presentations: Cephalic    Cervix: Not assessed                              Complete OB US  10/13/2020 8:00 AM     HISTORY/REASON FOR " EXAM:  Evaluate fetal anatomy     TECHNIQUE/EXAM DESCRIPTION: OB complete ultrasound.     COMPARISON:  None     FINDINGS:  Fetal Lie:  Vertex  LMP:  5/25/2020  Clinical JASMIN by LMP:  3/1/2021     Placenta (Location):  Anterior  Placenta Previa: No  Placental Grade: I     Amniotic Fluid Volume:  CHUCHO = 15.01 cm     Fetal Heart Rate:  146 bpm     Cervical Length:  4.20 cm transabdominal     No maternal adnexal mass is identified.     Umbilical Artery S/D Ratio(s):  Not applicable     Fetal Anatomy  (Seen or Not Seen)  Lateral Ventricles     Seen  Cisterna Magna        Seen  Cerebellum              Seen  CSP             Seen  Orbits             Seen  Face/Lips                Seen  Cord Insertion         Seen  Placental CI         Seen  4 Chamber Heart     Seen  LVOT               Seen  RVOT              Seen  3 Vessel View     Seen  Stomach       Seen  Kidneys                   Seen  Urinary Bladder      Seen  Spine                       Seen  3 Vessel Cord          Seen  Both Upper Extremities    Seen  Both Lower Extremities    Seen  Diaphragm             Seen  Movement       Seen  Gender:  Likely female     Fetal Biometry  BPD    4.63 cm, 20 weeks, (43.4%)  HC    17.08 cm, 19 weeks, 5 days, (21.8%)  AC    14.47 cm, 19 weeks, 6 days, (32.4%)  Femur Length    3.00 cm, 19 weeks, 2 days, (15.0%)  Humerus Length    2.94 cm, 19 weeks, 4 days, (33.6%)  Cerebellum Diameter   2.13 cm, 20 weeks, 1 day, (75.1%)     EGA by this US:  19 weeks, 5 days  JASMIN by this US: 3/4/2021  JASMIN by 1st US:  3/1/2021 by MD     Estimated Fetal Weight:  300 grams  EFW Percentile: 18.3%     Comments:  There is a nonspecific echogenic focus in the left ventricle of the fetal heart.     IMPRESSION:     1.  Single intrauterine pregnancy of an estimated gestational age of 19 weeks, 5 days with an estimated date of delivery of 3/4/2021.  2.  There is a nonspecific echogenic focus in the left ventricle of the fetal heart.  3.  Fetal survey is otherwise  within normal limits.    Follow up growth US:  1/21/2021 9:27 AM     HISTORY/REASON FOR EXAM:  S<D, advanced maternal age     TECHNIQUE/EXAM DESCRIPTION: OB limited ultrasound.     COMPARISON:  Obstetrical ultrasound 10/13/2020     FINDINGS:  Fetal Lie:  Vertex  LMP:  5/25/2020  Clinical JASMIN by LMP:  3/1/2021     Placenta (Location):  Anterior  Placenta Previa: No  Placental Grade: II     Amniotic Fluid Volume:  CHUCHO = 16.31 cm     Fetal Heart Rate:  132 bpm     No maternal adnexal mass is identified.     Fetal Biometry  BPD    7.92 cm, 31 weeks, 6 days, (1.8%)  HC    30.07 cm, 33 weeks, 2 days, (3.9%)  AC    29.82 cm, 33 weeks, 6 days, (36%)  Femur Length    6.26 cm, 32 weeks, 3 days, (4.7%)  Humerus Length    5.47 cm, 31 weeks, 6 days, (6.3%)     EGA by this US:  32 weeks, 4 days  JASMIN by this US: 3/14/2021  JASMIN by 1st US:  3/1/2021     Estimated Fetal Weight:  2134 grams  EFW Percentile: 15.2%        IMPRESSION:     Single intrauterine pregnancy of an estimated gestational age of 32 weeks, 4 days with an estimated date of delivery of 3/14/2021.    Lab Review  Lab:   Blood type: O     Recent Results (from the past 5880 hour(s))   POCT Pregnancy    Collection Time: 09/02/20  9:29 AM   Result Value Ref Range    POC Urine Pregnancy Test Positive Negative    Internal Control Positive Positive     Internal Control Negative Negative    Chlamydia/GC PCR Urine Or Swab    Collection Time: 09/22/20  4:06 PM    Specimen: Genital   Result Value Ref Range    C. trachomatis by PCR Negative Negative    N. gonorrhoeae by PCR Negative Negative    Source Genital    AFP TETRA    Collection Time: 10/16/20 12:10 PM   Result Value Ref Range    AFP Value -Eia 62 ng/mL    AFP MOM Value 0.98     Ue3 Value 2.41 ng/mL    Ue3 Mom 0.91     Patient's hCG, 2nd Trimester 8194 IU/L    hCG MoM, 2nd Trimester 0.39     Ginny Value -Eia 111 pg/mL    Ginny Mom Value 0.62     Interpretation Screen Neg     M[age]ASMIN 40.6 yr    Maternal Weight 146.0  lbs.     Gest. Age on Collection Date 20 wks, 4 days     Gestational Age Based On Other     Multiple Pregnancy Peterson     Race Nonblack     Insulin Dependent Diabetes No     Smoking No     Family Hx NTD No     Family Hx of Aneuploidy No     Specimen See Note     EER Quad, Maternal Serum See Note    HEP C VIRUS ANTIBODY    Collection Time: 10/16/20 12:10 PM   Result Value Ref Range    Hepatitis C Antibody Non-Reactive Non-Reactive   PRENATAL PANEL 3+HIV+HCV    Collection Time: 10/26/20  4:56 PM   Result Value Ref Range    WBC 9.9 4.8 - 10.8 K/uL    RBC 3.97 (L) 4.20 - 5.40 M/uL    Hemoglobin 12.8 12.0 - 16.0 g/dL    Hematocrit 37.7 37.0 - 47.0 %    MCV 95.0 81.4 - 97.8 fL    MCH 32.2 27.0 - 33.0 pg    MCHC 34.0 33.6 - 35.0 g/dL    RDW 44.0 35.9 - 50.0 fL    Platelet Count 244 164 - 446 K/uL    MPV 10.7 9.0 - 12.9 fL    Neutrophils-Polys 71.60 44.00 - 72.00 %    Lymphocytes 19.10 (L) 22.00 - 41.00 %    Monocytes 7.50 0.00 - 13.40 %    Eosinophils 0.70 0.00 - 6.90 %    Basophils 0.20 0.00 - 1.80 %    Immature Granulocytes 0.90 0.00 - 0.90 %    Nucleated RBC 0.00 /100 WBC    Neutrophils (Absolute) 7.05 2.00 - 7.15 K/uL    Lymphs (Absolute) 1.88 1.00 - 4.80 K/uL    Monos (Absolute) 0.74 0.00 - 0.85 K/uL    Eos (Absolute) 0.07 0.00 - 0.51 K/uL    Baso (Absolute) 0.02 0.00 - 0.12 K/uL    Immature Granulocytes (abs) 0.09 0.00 - 0.11 K/uL    NRBC (Absolute) 0.00 K/uL    Rubella IgG Antibody 73.80 IU/mL    Hepatitis B Surface Antigen Non-Reactive Non-Reactive    Hepatitis C Antibody Non-Reactive Non-Reactive    Syphilis, Treponemal Qual Non-Reactive Non-Reactive   HIV AG/AB COMBO ASSAY SCREENING    Collection Time: 10/26/20  4:56 PM   Result Value Ref Range    HIV Ag/Ab Combo Assay Non-Reactive Non Reactive   OP Prenatal Panel-Blood Bank    Collection Time: 10/26/20  4:57 PM   Result Value Ref Range    ABO Grouping Only O     Rh Grouping Only POS     Antibody Screen Scrn NEG    GLUCOSE 1HR GESTATIONAL    Collection Time:  11/25/20  5:02 PM   Result Value Ref Range    Glucose, Post Dose 149 (H) 70 - 139 mg/dL   T.PALLIDUM AB EIA    Collection Time: 11/25/20  5:03 PM   Result Value Ref Range    Syphilis, Treponemal Qual Non-Reactive Non-Reactive   CBC WITHOUT DIFFERENTIAL    Collection Time: 11/25/20  5:03 PM   Result Value Ref Range    WBC 10.7 4.8 - 10.8 K/uL    RBC 3.70 (L) 4.20 - 5.40 M/uL    Hemoglobin 11.8 (L) 12.0 - 16.0 g/dL    Hematocrit 34.7 (L) 37.0 - 47.0 %    MCV 93.8 81.4 - 97.8 fL    MCH 31.9 27.0 - 33.0 pg    MCHC 34.0 33.6 - 35.0 g/dL    RDW 42.7 35.9 - 50.0 fL    Platelet Count 244 164 - 446 K/uL    MPV 10.8 9.0 - 12.9 fL   GTT  (GESTATIONAL GLUCOSE 3 HR)    Collection Time: 12/04/20 10:15 AM   Result Value Ref Range    Baseline Glucose 72 65 - 95 mg/dL    Glucose 1 Hour 176 65 - 180 mg/dL    Glucose 2 Hour 145 65 - 155 mg/dL    Glucose 3 Hour 138 65 - 140 mg/dL   POCT Fetal Nonstress Test    Collection Time: 02/04/21  9:59 AM   Result Value Ref Range    NST Indications AMA     NST Baseline 120     NST Uterine Activity none     NST Acoustic Stimulation none     NST Assessment       reactive moderate variability positive accels no decels    NST Action Necessary none     NST Other Data none     NST Return Next week     NST Read By Shree         Assessment:   1.  IUP at 36w4d  2.  Labor status: 3rd stage labor.  3.  Precipitous delivery in hospital  4.  Obstetrical history significant for   Patient Active Problem List    Diagnosis Date Noted   • Advanced maternal age in multigravida 01/07/2021   .      Plan:     Admit to L&D  GBS unknown  Routine labs  Pain management prn    Kelsi Grant, GASTON, APRN

## 2021-02-06 NOTE — CARE PLAN
Problem: Altered physiologic condition related to immediate post-delivery state and potential for bleeding/hemorrhage  Goal: Patient physiologically stable as evidenced by normal lochia, palpable uterine involution and vital signs within normal limits  Outcome: PROGRESSING AS EXPECTED  Note: Fundus firm. Lochia light. Patient educated to call if saturating a pad in more than hour or for large clots.       Problem: Alteration in comfort related to episiotomy, vaginal repair and/or after birth pains  Goal: Patient verbalizes acceptable pain level  Outcome: PROGRESSING AS EXPECTED  Note: Pateint reports 6/10 pain. Patient wishes to wait for next dose of ibuprofen and not take tylenol. Patient aware of prn pain medication and nonpharmacologic interventions.

## 2021-02-06 NOTE — PROCEDURES
SPONTANEOUS VAGINAL DELIVERY PROCEDURE NOTE    PATIENT ID:  NAME:  Gloria Carrion  MRN:               [id number]  YOB: 1980    Labor Course: Patient was admitted to Labor and Delivery at 36w4d for active labor with precipitous delivery in hospital elevator.  Pregnancy complicated by AMA and unknown GBS status    Notified by Charge RN of precipitous delivery in elevator en route to L&D unit. Upon entering room, pt in bed with infant on abdomen.Cord remained attached and placenta undelivered.   Per patient report, she began having cramping yesterday, becoming worse today. She reports SROM at  and immediately proceeded to hospital.    On 2021  at 21:08, this 40 y.o., now  36w4d , GBS unknown female delivered via precipitous  under no anesthesia a viable female infant weight pending skin to skin transition with APGAR scores of 8 and 9 at one and five minutes. There was no known nuchal cord, mother was transferred to room with vigorous baby on abdomen. Upon CNM arrival into room, cord was doubly clamped, cut and infant handed to RN in attendance. An intact placenta was delivered spontaneously at 21:15 with 3 vessel cord. Upon vaginal exam, an intact perineum was noted. Estimated blood loss was 300cc. Patient will be transferred to postpartum in stable condition and infant to  nursery.      Delivery attended by Kelsi Grant CNM, APRN.

## 2021-02-06 NOTE — PROGRESS NOTES
Obstetrics & Gynecology Post-Delivery Progress Note    Date of Service      40 y.o.  s/p vaginal, spontaneous precipitous in hospital elevator  Delivery date: 21    Events  No events    Subjective  Pain: No  Bleeding: lochia minimal  Tolerating PO: yes  Voiding: without difficulty  Ambulating: yes  Passing flatus: Yes  Feeding: breastfeeding well    Objective  24hr VS:  Temp:  [36.4 °C (97.5 °F)-36.9 °C (98.4 °F)] 36.9 °C (98.4 °F)  Pulse:  [56-71] 64  Resp:  [16] 16  BP: (114-140)/(58-65) 114/64  SpO2:  [98 %] 98 %    Physical Exam  General: well  Chest/Breasts: breasts soft   Abdomen: soft, non-distended  Fundus: firm and below umbilicus  Incision: not applicable, (vaginal delivery)  Perineum: deferred  Extremities: symmetric and no edema, calves nontender    Labs:  Recent Labs     210 21  0021   WBC 14.7* 18.2*   RBC 4.42 3.79*   HEMOGLOBIN 13.9 12.0   HEMATOCRIT 40.9 35.4*   MCV 92.5 93.4   MCH 31.4 31.7   RDW 44.5 44.9   PLATELETCT 277 232   MPV 11.4 11.2   NEUTSPOLYS 66.10  --    LYMPHOCYTES 23.50  --    MONOCYTES 8.40  --    EOSINOPHILS 0.60  --    BASOPHILS 0.20  --          Medications    •  oxytocin, , ,     •  prenatal plus vitamin, 1 Tab, Oral, Daily-0800      ibuprofen, acetaminophen, LR, tetanus-dipth-acell pertussis, measles, mumps and rubella vaccine, oxytocin, misoprostol, methylergonovine, carboPROST, docusate sodium      Assessment/Plan  Gloria Carrion is a 40 y.o.yo  s/p postpartum day #1  s/p precipitous  vaginal, spontaneous delivery in elevator. Pregnancy complicated by AMA and unknown GBS status.    - Post care: meeting all goals  - Pain: controlled  - Rh+, Rubella Immune  - Method of Feeding: plans to breastfeed  - Method of Contraception: Will discuss tomorrow  VTE prophylaxis: none indicated    - Disposition: likely home postpartum day 2    Claudia Mazariegos MD  Family Medicine Resident, PGY-1

## 2021-02-06 NOTE — LACTATION NOTE
This note was copied from a baby's chart.  Initial visit. Spoke with translation assistance from Cristiane KELLER. This is mother's first time breastfeeding. Infant was initiated on LPI plan last night. Infant is crying and fussy for hearing screen test, and when offered breast, infant too upset to latch. Mother does report some soreness, asked her to call for assistance for latch, so we can provide education on positioning. Assessed flange fit, no rubbing in flanges noted. Settings reviewed, and mother comfortable at 30% suction. Reviewed LPI education, Your LPI Baby and AWOHNN handouts in Danish provided. Reviewed feeding plan with mother.    Plan is to breastfeed/attempt to latch every 3 hours, supplement with formula then use HG pump for 15 minutes to help establish supply.     Mother states she has a manual pump at home.   Mother [facility] [employer] [facility] [facility], encouraged her to call for an electric pump due to infant being LPI.     Encouraged mother to call for latch support.

## 2021-02-06 NOTE — PROGRESS NOTES
EDC 3/1=36.4 weeks gestation who arrived precipitously delivering in L&D elevators. Upon arrival baby was already delivered in the wheelchair- approx at , female infant placed on moms chest, pt transferred to [id number].   : ELISE Grant at bedside, placental delivered at , no lacerations noted, 8/9 apgars- infant placed skin to skin with mom. IV started, labs drawn and sent by Román KELLER.  : Ipad  used to complete admission profile  : Report given to Binta Phelan RN. POC discussed.

## 2021-02-07 ENCOUNTER — PHARMACY VISIT (OUTPATIENT)
Dept: PHARMACY | Facility: MEDICAL CENTER | Age: 41
End: 2021-02-07
Payer: COMMERCIAL

## 2021-02-07 VITALS
WEIGHT: 162 LBS | HEIGHT: 59 IN | TEMPERATURE: 97.6 F | RESPIRATION RATE: 19 BRPM | OXYGEN SATURATION: 97 % | DIASTOLIC BLOOD PRESSURE: 73 MMHG | SYSTOLIC BLOOD PRESSURE: 113 MMHG | HEART RATE: 88 BPM | BODY MASS INDEX: 32.66 KG/M2

## 2021-02-07 LAB — GP B STREP DNA SPEC QL NAA+PROBE: NEGATIVE

## 2021-02-07 PROCEDURE — RXMED WILLOW AMBULATORY MEDICATION CHARGE: Performed by: STUDENT IN AN ORGANIZED HEALTH CARE EDUCATION/TRAINING PROGRAM

## 2021-02-07 PROCEDURE — A9270 NON-COVERED ITEM OR SERVICE: HCPCS | Performed by: NURSE PRACTITIONER

## 2021-02-07 PROCEDURE — 700102 HCHG RX REV CODE 250 W/ 637 OVERRIDE(OP): Performed by: NURSE PRACTITIONER

## 2021-02-07 RX ORDER — PSEUDOEPHEDRINE HCL 30 MG
100 TABLET ORAL 2 TIMES DAILY PRN
Qty: 60 CAP | Refills: 0 | Status: SHIPPED | OUTPATIENT
Start: 2021-02-07

## 2021-02-07 RX ORDER — IBUPROFEN 200 MG
800 TABLET ORAL EVERY 8 HOURS PRN
Qty: 30 TAB | Refills: 0 | Status: SHIPPED | OUTPATIENT
Start: 2021-02-07

## 2021-02-07 RX ORDER — ACETAMINOPHEN 325 MG/1
650 TABLET ORAL EVERY 4 HOURS PRN
Qty: 30 TAB | Refills: 0 | Status: SHIPPED | OUTPATIENT
Start: 2021-02-07

## 2021-02-07 RX ADMIN — IBUPROFEN 800 MG: 800 TABLET, FILM COATED ORAL at 08:23

## 2021-02-07 RX ADMIN — PRENATAL WITH FERROUS FUM AND FOLIC ACID 1 TABLET: 3080; 920; 120; 400; 22; 1.84; 3; 20; 10; 1; 12; 200; 27; 25; 2 TABLET ORAL at 08:20

## 2021-02-07 ASSESSMENT — PAIN DESCRIPTION - PAIN TYPE
TYPE: ACUTE PAIN

## 2021-02-07 NOTE — DISCHARGE SUMMARY
Discharge Summary:     Date of Admission: 2021  Date of Discharge: 21      Admitting diagnosis:    1. Pregnancy @ 36w4d, AMA      Discharge Diagnosis:   1. Status post precipitous vaginal, spontaneous delivery in elevator    Pregnancy Complications: group B strep unknown (untreated)  Tubal Ligation:  no    History reviewed. No pertinent past medical history.  OB History    Para Term  AB Living   5 5 4 1   5   SAB TAB Ectopic Molar Multiple Live Births           0 5      # Outcome Date GA Lbr Bishnu/2nd Weight Sex Delivery Anes PTL Lv   5  21 36w4d  2.6 kg (5 lb 11.7 oz) F Vag-Spont None Y ROSALIA      Complications: Precipitous delivery   4 Term 18 39w2d   F Vag-Spont   ROSALIA   3 Term 10/07/06 40w0d  3.1 kg (6 lb 13.4 oz) F Vag-Spont   ROSALIA   2 Term 03 40w0d  2.4 kg (5 lb 4.7 oz) M Vag-Spont   ROSALIA   1 Term 02 40w0d  2.8 kg (6 lb 2.8 oz) M Vag-Spont   ROSALIA     History reviewed. No pertinent surgical history.  Patient has no known allergies.    Patient Active Problem List   Diagnosis   • Advanced maternal age in multigravida       Hospital Course:   40 y.o. , now para 5, was admitted with the above mentioned diagnosis, underwent Active Labor, vaginal, spontaneous. Pt gave birth to baby girl with APGARs of 8/9 and weight 2600g.  Patient's postpartum course was unremarkable, with progressive advancement in diet , ambulation and toleration of oral analgesia. Patient without complaints today and desires discharge.  For postpartum contraception, pt desires natural planning methods.    Physical Exam:  Temp:  [36.4 °C (97.5 °F)] 36.4 °C (97.5 °F)  Pulse:  [69] 69  Resp:  [18] 18  BP: (125)/(78) 125/78  SpO2:  [98 %] 98 %  Physical Exam  General: well  Chest/Breasts: breasts soft   Abdomen: soft, non-distended  Fundus: firm and below umbilicus  Incision: not applicable, (vaginal delivery)  Perineum: deferred  Extremities: symmetric and no edema, calves nontender    Current  Facility-Administered Medications   Medication Dose   • oxytocin (PITOCIN) infusion (for postpartum)   mL/hr   • ibuprofen (MOTRIN) tablet 800 mg  800 mg   • acetaminophen (Tylenol) tablet 650 mg  650 mg   • LR infusion     • tetanus-dipth-acell pertussis (Tdap) inj 0.5 mL  0.5 mL   • measles, mumps and rubella vaccine (MMR) injection 0.5 mL  0.5 mL   • PRN oxytocin (PITOCIN) (20 Units/1000 mL) PRN for excessive uterine bleeding - See Admin Instr  125-999 mL/hr   • miSOPROStol (CYTOTEC) tablet 600 mcg  600 mcg   • methylergonovine (METHERGINE) injection 0.2 mg  0.2 mg   • carboPROST (HEMABATE) injection 250 mcg  250 mcg   • docusate sodium (COLACE) capsule 100 mg  100 mg   • prenatal plus vitamin (STUARTNATAL 1+1) 27-1 MG tablet 1 Tab  1 Tab       Recent Labs     21  2120 21  0021 21  0623   WBC 14.7* 18.2* 14.7*   RBC 4.42 3.79* 3.74*   HEMOGLOBIN 13.9 12.0 12.0   HEMATOCRIT 40.9 35.4* 34.3*   MCV 92.5 93.4 91.7   MCH 31.4 31.7 32.1   MCHC 34.0 33.9 35.0   RDW 44.5 44.9 43.2   PLATELETCT 277 232 215   MPV 11.4 11.2 10.7       Activity:   Discharge to home  Pelvic Rest x 6 weeks  Call or come to ED for: heavy vaginal bleeding, fever >100.4, severe abdominal pain, severe headache, chest pain, shortness of breath,  N/V, incisional drainage, or other concerns.      Assessment:  normal postpartum course     [facility] up: [facility] or [facility] in 5 weeks for vaginal delivery; 1 week for incision check for  delivery.      Discharge Instructions:  Pelvic rest x 6 weeks  No heavy lifting until cleared by physician  Return to ED or come to the office for severe headache, shortness of breath, chest pain, heavy vaginal bleeding, incisional drainage, foul smelling vaginal discharge, or fever >100.4     Discharge Meds:      Medication List      START taking these medications      Instructions   acetaminophen 325 MG Tabs  Commonly known as: Tylenol   Take 2 Tabs by mouth every four hours as  needed.  Dose: 650 mg     docusate sodium 100 MG Caps   Take 100 mg by mouth 2 times a day as needed for Constipation.  Dose: 100 mg     ibuprofen 800 MG Tabs  Commonly known as: MOTRIN   Take 1 Tab by mouth every 8 hours as needed (For cramping after delivery; do not give if patient is receiving ketorolac (Toradol)).  Dose: 800 mg        CONTINUE taking these medications      Instructions   PRENATAL 1 PO   Take  by mouth.              Claudia Mazariegos MD  Family Medicine Resident, PGY-1   no

## 2021-02-07 NOTE — DISCHARGE INSTRUCTIONS
POSTPARTUM DISCHARGE INSTRUCTIONS FOR MOM    YOB: 1980   Age: 40 y.o.               Admit Date: 2/5/2021     Discharge Date: 2/7/2021  Attending Doctor:  Raheem Almonte M.D.                  Allergies:  Patient has no known allergies.    Discharged to home by car. Discharged via wheelchair, hospital escort: Yes.  Special equipment needed: Not Applicable  Belongings with: Personal  Be sure to schedule a follow-up appointment with your primary care doctor or any specialists as instructed.     Discharge Plan:   Diet Plan: Discussed  Activity Level: Discussed  Confirmed Follow up Appointment: Patient to Call and Schedule Appointment  Confirmed Symptoms Management: Discussed  Influenza Vaccine Indication: Not indicated: Previously immunized this influenza season and > 8 years of age  ·       QUIT SMOKING/TOBACCO USE:  I understand the use of any tobacco products increases my chance of suffering from future heart disease and could cause other illnesses which may shorten my life. Quitting the use of tobacco products is the single most important thing I can do to improve my health. For further information on smoking / tobacco cessation call a Toll Free Quit Line at 1-129.751.8834 (*[employer] [place]) or 1-410.898.3297 (American Lung Association) or you can access the web based program at www.lungusa.org.    · [place] Tobacco Users Help Line:  (339) 855-3883       Toll Free: 1-511.817.3060  · Quit Tobacco Program [facility] (004)409-2653    DEPRESSION / SUICIDE RISK:  As you are discharged from this [facility], it is important to learn how to keep safe from harming yourself.    Recognize the warning signs:  · Abrupt changes in personality, positive or negative- including increase in energy   · Giving away possessions  · Change in eating patterns- significant weight changes-  positive or negative  · Change in sleeping patterns- unable to sleep or sleeping all the  time   · Unwillingness or inability to communicate  · Depression  · Unusual sadness, discouragement and loneliness  · Talk of wanting to die  · Neglect of personal appearance   · Rebelliousness- reckless behavior  · Withdrawal from people/activities they love  · Confusion- inability to concentrate     If you or a loved one observes any of these behaviors or has concerns about self-harm, here's what you can do:  · Talk about it- your feelings and reasons for harming yourself  · Remove any means that you might use to hurt yourself (examples: pills, rope, extension cords, firearm)  · Get professional help from the community (Mental Health, Substance Abuse, psychological counseling)  · Do not be alone:Call your Safe Contact- someone whom you trust who will be there for you.  · Call your local CRISIS HOTLINE 015-3783 or 524-388-2010  · Call your local Children's Mobile Crisis Response Team [place] (630) 669-5765 or www.Geomagic  · Call the toll free National Suicide Prevention Hotlines   · National Suicide Prevention Lifeline 783-332-BFEN (2016)  · [place] Hope Line Network 800-SUICIDE (895-4558)    DISCHARGE SURVEY:  Thank you for choosing [facility].  We hope we provided you with very good care.  You may be receiving a survey in the mail.  Please fill it out.  Your opinion is valuable to us.    ADDITIONAL EDUCATIONAL MATERIALS GIVEN TO PATIENT:        My signature on this form indicates that:  1.  I have reviewed and understand the above information  2.  My questions regarding this information have been answered to my satisfaction.  3.  I have formulated a plan with my discharge nurse to obtain my prescribed medication for home.

## 2021-02-07 NOTE — PROGRESS NOTES
Assessment done. Pt.complaines of pain at 5 pain medication given..Fundus firm.Lochia light. Fob at bedside,supportive.Pt. Caring for baby with good skill. bottlefeeding and pumping to establish milk supply.

## 2021-02-07 NOTE — CARE PLAN
Problem: Potential knowledge deficit related to lack of understanding of self and  care  Goal: Patient will demonstrate ability to care for self and infant  Outcome: PROGRESSING AS EXPECTED  Note: Patient able to care for self and infant. Feeding plan in place for LPI.      Problem: Potential anxiety related to difficulty adapting to parental role  Goal: Patient will verbalize and demonstrate effective bonding and parenting behavior  Outcome: PROGRESSING AS EXPECTED  Note: Patient has multiple other children and does not express or exhibit signs of anxiety at this time. Patient seen breastfeeding and holding infant. Patient bonding appropriately with infant.

## 2021-02-07 NOTE — DISCHARGE PLANNING
Meds-to-Beds: Discharge prescription orders listed below delivered to patient's bedside  By Janelle. RN notified. Written information regarding the dispensed prescriptions was provided to the patient including the phone number of the pharmacy to call for any questions.         Gloria Wong   Home Medication Instructions MIKE:[id number]    Printed on:02/07/21 1156   Medication Information                      acetaminophen (TYLENOL) 325 MG Tab  Take 2 Tabs by mouth every four hours as needed.             docusate sodium 100 MG Cap  Take 1 capsule by mouth 2 times a day as needed for Constipation.             ibuprofen (MOTRIN) 200 MG Tab  Take 4 Tabs by mouth every 8 hours as needed (For cramping after delivery).                 Yolanda Honeycutt, PharmD

## 2021-02-07 NOTE — LACTATION NOTE
Mother reports infant has been able to latch for a few sucks at a time, nipples flat with tip inversions bilaterally. Assisted to attempt latch in cross cradle hold, infant opens mouth at breast but does not initiate suck. Discussed signs of deep latch and reviewed positioning and latch at breast. Mother reports none of her other babies fed at breast, reports they all had difficulty with latching. Discussed option of using breast pump to help romy nipples prior to latch. Mother already has manual breast pump at home and reports she will contact [facility] tomorrow to  electric breast pump. Plan is to practice breastfeeding first for up to 10 minutes (may use pump to romy nipples for 1-2 minutes prior to latch attempt) then pump and supplement per LPI feeding plan every 3 hours. Mother denies questions/concerns. Interpretor Shravan #[id number] for Jamaican language via language line solutions.

## 2021-02-08 NOTE — PROGRESS NOTES
Mom and baby cleared to discharge. Mom aware to call if baby more jaundice. Mom aware to feed baby frequently and will take baby in for check tomorrow.mom and baby discharged to home.

## 2021-02-17 NOTE — ED PROVIDER NOTES
Emergency Obstetric Consultation   Date of service: 2/5/21    Pt presents with precipitous delivery off unit. She was a direct admit to labor and delivery for management of 3rd stage of labor.    See H&P      MIKEY Riley.

## 2021-03-16 ENCOUNTER — POST PARTUM (OUTPATIENT)
Dept: OBGYN | Facility: CLINIC | Age: 41
End: 2021-03-16

## 2021-03-16 VITALS — DIASTOLIC BLOOD PRESSURE: 60 MMHG | SYSTOLIC BLOOD PRESSURE: 110 MMHG | BODY MASS INDEX: 29.69 KG/M2 | WEIGHT: 147 LBS

## 2021-03-16 PROCEDURE — 0503F POSTPARTUM CARE VISIT: CPT | Performed by: NURSE PRACTITIONER

## 2021-03-16 ASSESSMENT — ENCOUNTER SYMPTOMS
CONSTITUTIONAL NEGATIVE: 1
PSYCHIATRIC NEGATIVE: 1
MUSCULOSKELETAL NEGATIVE: 1
NEUROLOGICAL NEGATIVE: 1
EYES NEGATIVE: 1
GASTROINTESTINAL NEGATIVE: 1
CARDIOVASCULAR NEGATIVE: 1
RESPIRATORY NEGATIVE: 1

## 2021-03-16 NOTE — PROGRESS NOTES
Subjective:      Gloria Carrion is a 40 y.o.  female who presents for her postpartum exam. She had a  without complication. Her prenatal course was complicated by AMA status. Eating a regular diet without difficulty. Bowel movement are Normal.  The patient is not having any pain. Vaginal bleeding: none. Patient Denies Incisional pain, drainage or redness.  She is both breast and bottle feeding. She desires a Nexplanon for her birth control method. Reports no sex prior to this appointment.  Denies any S/S of PP depression.    Assessment   Assessment:    1. PP care of lactating women   2. Exam WNL   3. Pap needed-information on [facility] given  4. Desires contraception   5. EPDS score: 1    Patient Active Problem List    Diagnosis Date Noted   • Encounter for postpartum care of lactating mother 2021   • Advanced maternal age in multigravida 2021       Plan   Plan:    1. Breastfeeding support   2. Continue PNV   3. Contraceptive counseling - follow up w health dept or [facility] for BCM  4. Encouraged condom use, condoms provided  5. Discussed diet, exercise and resumption of sexual activity   6. Gave copy of pap   7.  F/u c PCP or [facility] as needed for primary care needs.   HPI    Review of Systems   Constitutional: Negative.    HENT: Negative.    Eyes: Negative.    Respiratory: Negative.    Cardiovascular: Negative.    Gastrointestinal: Negative.    Genitourinary: Negative.    Musculoskeletal: Negative.    Skin: Negative.    Neurological: Negative.    Endo/Heme/Allergies: Negative.    Psychiatric/Behavioral: Negative.           Objective:     /60   Wt 66.7 kg (147 lb)   LMP 2020   BMI 29.69 kg/m²      Physical Exam  Constitutional:       Appearance: She is well-developed.   Pulmonary:      Effort: Pulmonary effort is normal.   Abdominal:      Palpations: Abdomen is soft.   Genitourinary:     Exam position: Supine.      Labia:         Right: No rash,  tenderness, lesion or injury.         Left: No rash, tenderness, lesion or injury.       Vagina: Normal. No signs of injury. No vaginal discharge, erythema, tenderness or bleeding.      Rectum: No tenderness.   Skin:     General: Skin is warm and dry.   Neurological:      Mental Status: She is alert and oriented to person, place, and time.   Psychiatric:         Behavior: Behavior normal.         Thought Content: Thought content normal.         Judgment: Judgment normal.                 Assessment/Plan:        1. Encounter for postpartum care of lactating mother

## 2021-03-16 NOTE — PROGRESS NOTES
Pt here today for postpartum exam.  Delivery type: Vaginal 02/05/2021  Currently : Breast and bottle feeding   Self pay[facility] given to patient.  Desired BCM: Nexplanon   LMP: N/a  Last pap: 2018 WNL   Phone # [phone]